# Patient Record
Sex: FEMALE | Race: WHITE | Employment: OTHER | URBAN - METROPOLITAN AREA
[De-identification: names, ages, dates, MRNs, and addresses within clinical notes are randomized per-mention and may not be internally consistent; named-entity substitution may affect disease eponyms.]

---

## 2017-02-06 ENCOUNTER — TELEPHONE (OUTPATIENT)
Dept: CARDIOLOGY CLINIC | Facility: CLINIC | Age: 82
End: 2017-02-06

## 2017-02-06 NOTE — TELEPHONE ENCOUNTER
Pt was at [facility] a couple of weeks ago with sob and edema.l Her medications were changed during her hospitalization. Per daughter would like to know if she should continue with changes.    Pt has not seen Dr. Viki Apley since 2015, No available appts

## 2017-02-06 NOTE — TELEPHONE ENCOUNTER
[facility] pt was d/c from [facility] and have questions re: Digoxin & Metoprolol rx. Pls call. Thank you.

## 2017-02-10 ENCOUNTER — PRIOR ORIGINAL RECORDS (OUTPATIENT)
Dept: OTHER | Age: 82
End: 2017-02-10

## 2017-02-10 ENCOUNTER — MYAURORA ACCOUNT LINK (OUTPATIENT)
Dept: OTHER | Age: 82
End: 2017-02-10

## 2017-04-05 ENCOUNTER — OFFICE VISIT (OUTPATIENT)
Dept: CARDIOLOGY CLINIC | Facility: CLINIC | Age: 82
End: 2017-04-05

## 2017-04-05 VITALS
SYSTOLIC BLOOD PRESSURE: 108 MMHG | DIASTOLIC BLOOD PRESSURE: 50 MMHG | BODY MASS INDEX: 22 KG/M2 | HEART RATE: 72 BPM | WEIGHT: 129 LBS | RESPIRATION RATE: 18 BRPM

## 2017-04-05 DIAGNOSIS — I50.32 DIASTOLIC DYSFUNCTION WITH CHRONIC HEART FAILURE (HCC): Primary | ICD-10-CM

## 2017-04-05 DIAGNOSIS — I07.1 SEVERE TRICUSPID REGURGITATION: ICD-10-CM

## 2017-04-05 DIAGNOSIS — I34.0 MODERATE MITRAL INSUFFICIENCY: ICD-10-CM

## 2017-04-05 PROCEDURE — G0463 HOSPITAL OUTPT CLINIC VISIT: HCPCS | Performed by: INTERNAL MEDICINE

## 2017-04-05 PROCEDURE — 99214 OFFICE O/P EST MOD 30 MIN: CPT | Performed by: INTERNAL MEDICINE

## 2017-04-05 RX ORDER — METOPROLOL TARTRATE 50 MG/1
25 TABLET, FILM COATED ORAL 2 TIMES DAILY
Status: ON HOLD | COMMUNITY
End: 2017-04-16

## 2017-04-05 NOTE — PATIENT INSTRUCTIONS
Continue current medications. Increase activity as tolerated. Follow-up with me in 6 months or sooner if needed.

## 2017-04-05 NOTE — PROGRESS NOTES
Georgia is a 80year old female. Patient presents with:  Atrial Fibrillation  Hypertension  CHF  Edema: lower extremity    HPI:   [place] is here for follow-up appointment.   She has history of diastolic dysfunction, chronic atrial fibrillation and pain  GI: denies abdominal pain and denies heartburn  NEURO: denies headaches    EXAM:   /50 mmHg  Pulse 72  Resp 18  Wt 129 lb (58.514 kg)  GENERAL: well developed, well nourished,in no apparent distress  SKIN: no rashes,no suspicious lesions  HEENT

## 2017-04-07 ENCOUNTER — TELEPHONE (OUTPATIENT)
Dept: CARDIOLOGY CLINIC | Facility: CLINIC | Age: 82
End: 2017-04-07

## 2017-04-07 RX ORDER — DIGOXIN 125 UG/1
1 TABLET ORAL DAILY
Status: ON HOLD | COMMUNITY
Start: 2017-04-05 | End: 2017-04-16

## 2017-04-11 ENCOUNTER — APPOINTMENT (OUTPATIENT)
Dept: CT IMAGING | Facility: HOSPITAL | Age: 82
DRG: 418 | End: 2017-04-11
Attending: EMERGENCY MEDICINE
Payer: MEDICARE

## 2017-04-11 ENCOUNTER — APPOINTMENT (OUTPATIENT)
Dept: GENERAL RADIOLOGY | Facility: HOSPITAL | Age: 82
DRG: 418 | End: 2017-04-11
Attending: EMERGENCY MEDICINE
Payer: MEDICARE

## 2017-04-11 ENCOUNTER — HOSPITAL ENCOUNTER (INPATIENT)
Facility: HOSPITAL | Age: 82
LOS: 5 days | Discharge: HOME | DRG: 418 | End: 2017-04-17
Attending: EMERGENCY MEDICINE | Admitting: HOSPITALIST
Payer: MEDICARE

## 2017-04-11 DIAGNOSIS — K85.90 ACUTE PANCREATITIS, UNSPECIFIED COMPLICATION STATUS, UNSPECIFIED PANCREATITIS TYPE: ICD-10-CM

## 2017-04-11 DIAGNOSIS — I50.32 DIASTOLIC DYSFUNCTION WITH CHRONIC HEART FAILURE (HCC): ICD-10-CM

## 2017-04-11 DIAGNOSIS — K44.9 HIATAL HERNIA: ICD-10-CM

## 2017-04-11 DIAGNOSIS — K31.7 GASTRIC POLYPS: Primary | ICD-10-CM

## 2017-04-11 PROBLEM — R79.89 AZOTEMIA: Status: ACTIVE | Noted: 2017-04-11

## 2017-04-11 PROBLEM — R73.9 HYPERGLYCEMIA: Status: ACTIVE | Noted: 2017-04-11

## 2017-04-11 PROCEDURE — 71010 XR CHEST AP PORTABLE  (CPT=71010): CPT

## 2017-04-11 PROCEDURE — 99223 1ST HOSP IP/OBS HIGH 75: CPT | Performed by: HOSPITALIST

## 2017-04-11 PROCEDURE — 74177 CT ABD & PELVIS W/CONTRAST: CPT

## 2017-04-11 RX ORDER — MORPHINE SULFATE 2 MG/ML
2 INJECTION, SOLUTION INTRAMUSCULAR; INTRAVENOUS ONCE
Status: COMPLETED | OUTPATIENT
Start: 2017-04-11 | End: 2017-04-11

## 2017-04-11 RX ORDER — ONDANSETRON 2 MG/ML
4 INJECTION INTRAMUSCULAR; INTRAVENOUS ONCE
Status: COMPLETED | OUTPATIENT
Start: 2017-04-11 | End: 2017-04-11

## 2017-04-11 RX ORDER — SODIUM CHLORIDE 9 MG/ML
INJECTION, SOLUTION INTRAVENOUS ONCE
Status: COMPLETED | OUTPATIENT
Start: 2017-04-11 | End: 2017-04-11

## 2017-04-12 ENCOUNTER — APPOINTMENT (OUTPATIENT)
Dept: MRI IMAGING | Facility: HOSPITAL | Age: 82
DRG: 418 | End: 2017-04-12
Attending: INTERNAL MEDICINE
Payer: MEDICARE

## 2017-04-12 PROBLEM — K85.90 ACUTE PANCREATITIS, UNSPECIFIED COMPLICATION STATUS, UNSPECIFIED PANCREATITIS TYPE: Status: ACTIVE | Noted: 2017-04-12

## 2017-04-12 PROBLEM — K85.90 ACUTE PANCREATITIS: Status: ACTIVE | Noted: 2017-04-12

## 2017-04-12 PROCEDURE — 99223 1ST HOSP IP/OBS HIGH 75: CPT | Performed by: INTERNAL MEDICINE

## 2017-04-12 PROCEDURE — 74181 MRI ABDOMEN W/O CONTRAST: CPT

## 2017-04-12 RX ORDER — SODIUM CHLORIDE 9 MG/ML
INJECTION, SOLUTION INTRAVENOUS CONTINUOUS
Status: DISPENSED | OUTPATIENT
Start: 2017-04-12 | End: 2017-04-13

## 2017-04-12 RX ORDER — METOCLOPRAMIDE HYDROCHLORIDE 5 MG/ML
10 INJECTION INTRAMUSCULAR; INTRAVENOUS EVERY 6 HOURS PRN
Status: DISCONTINUED | OUTPATIENT
Start: 2017-04-12 | End: 2017-04-17

## 2017-04-12 RX ORDER — MEMANTINE HYDROCHLORIDE 10 MG/1
10 TABLET ORAL DAILY
Status: DISCONTINUED | OUTPATIENT
Start: 2017-04-12 | End: 2017-04-17

## 2017-04-12 RX ORDER — SODIUM CHLORIDE AND POTASSIUM CHLORIDE .9; .15 G/100ML; G/100ML
SOLUTION INTRAVENOUS CONTINUOUS
Status: DISCONTINUED | OUTPATIENT
Start: 2017-04-12 | End: 2017-04-12

## 2017-04-12 RX ORDER — DIGOXIN 125 MCG
125 TABLET ORAL DAILY
Status: DISCONTINUED | OUTPATIENT
Start: 2017-04-12 | End: 2017-04-12

## 2017-04-12 RX ORDER — MORPHINE SULFATE 2 MG/ML
2 INJECTION, SOLUTION INTRAMUSCULAR; INTRAVENOUS EVERY 2 HOUR PRN
Status: DISCONTINUED | OUTPATIENT
Start: 2017-04-12 | End: 2017-04-17

## 2017-04-12 RX ORDER — MORPHINE SULFATE 4 MG/ML
4 INJECTION, SOLUTION INTRAMUSCULAR; INTRAVENOUS EVERY 2 HOUR PRN
Status: DISCONTINUED | OUTPATIENT
Start: 2017-04-12 | End: 2017-04-17

## 2017-04-12 RX ORDER — 0.9 % SODIUM CHLORIDE 0.9 %
VIAL (ML) INJECTION
Status: COMPLETED
Start: 2017-04-12 | End: 2017-04-12

## 2017-04-12 RX ORDER — SODIUM CHLORIDE, SODIUM LACTATE, POTASSIUM CHLORIDE, CALCIUM CHLORIDE 600; 310; 30; 20 MG/100ML; MG/100ML; MG/100ML; MG/100ML
INJECTION, SOLUTION INTRAVENOUS CONTINUOUS
Status: DISCONTINUED | OUTPATIENT
Start: 2017-04-12 | End: 2017-04-14

## 2017-04-12 RX ORDER — ACETAMINOPHEN 325 MG/1
650 TABLET ORAL EVERY 6 HOURS PRN
Status: DISCONTINUED | OUTPATIENT
Start: 2017-04-12 | End: 2017-04-17

## 2017-04-12 RX ORDER — ONDANSETRON 2 MG/ML
4 INJECTION INTRAMUSCULAR; INTRAVENOUS EVERY 6 HOURS PRN
Status: DISCONTINUED | OUTPATIENT
Start: 2017-04-12 | End: 2017-04-17

## 2017-04-12 RX ORDER — FUROSEMIDE 10 MG/ML
10 INJECTION INTRAMUSCULAR; INTRAVENOUS ONCE
Status: COMPLETED | OUTPATIENT
Start: 2017-04-12 | End: 2017-04-12

## 2017-04-12 NOTE — CONSULTS
Sarah Peace 89   Gastroenterology Consultation Note    [place]  Patient Status:    Inpatient  Date of Admission:         4/11/2017, Hospital day #1  Attending:   Cinthia York MD  PCP:     Ioana Waddell    Reason for Consultatio that she drinks alcohol. She reports that she does not use illicit drugs.     Allergies:    Pcn [Penicillins]       Unknown    Medications:    Current facility-administered medications:   •  digoxin (LANOXIN) tab 125 mcg, 125 mcg, Oral, Daily  •  Memantine breathing  Abdomen- soft, mild epigastric ttp,no guarding/rebound  Skin- No jaundice  Ext: no cyanosis, mild edema  Neuro- Alert and interactive, but confused      Laboratory Data:    Lab Results  Component Value Date   WBC 8.4 04/12/2017   HGB 12.2 04/12/ year old female w/ a history of colon cancer (hx of hemicolectomy 30 years ago), afib (not on anticoagulation), severe tricuspid regurgitation, dementia, CHF, who presents with ab pain that started day before admission, acute onset, 7/10, non-radiating, mo

## 2017-04-12 NOTE — PROGRESS NOTES
[facility]    Progress Note    [place] Patient Status:  Inpatient    1924 MRN [id number]   Location [facility] 5SW/SE Attending Rachele Donahue MD   Hosp Day # 1 PCP Marisol Umaña     Subjective:     Constitutional pending      Results:     Lab Results  Component Value Date   WBC 8.4 04/12/2017   HGB 12.2 04/12/2017   HCT 38.2 04/12/2017    04/12/2017   CREATSERUM 0.65 04/12/2017   BUN 12 04/12/2017    04/12/2017   K 4.4 04/12/2017    04/12/2017

## 2017-04-12 NOTE — PROGRESS NOTES
Please refer to Dr. Fede Duran note for details  Here with acute pancreatitis  Ms. Archie Burroughs lives at home with her daughter- no etoh as likely etiology  Will look at meds as cause  Imaging per GI  GI to see  [facility]- sees Dr. Joel Schwab as an outpatient- plan consult to

## 2017-04-12 NOTE — ED PROVIDER NOTES
Patient Seen in: [facility] AND [facility] Emergency Department    History   No chief complaint on file. Stated Complaint: Chest pain    HPI    Patient complains of 10 out of 10 mid upper abdominal pain started today. Abdominal pain that began today.   Pain d negative except as noted above. PSFH elements reviewed from today and agreed except as otherwise stated in HPI.     Physical Exam       ED Triage Vitals   BP 04/11/17 2112 142/60 mmHg   Pulse 04/11/17 2112 122   Resp 04/11/17 2112 20   Temp 04/11/17 2112 PLATELET    Narrative: The following orders were created for panel order CBC WITH DIFFERENTIAL WITH PLATELET.   Procedure                               Abnormality         Status                     ---------                               ----------- Hyperglycemia R73.9 4/11/2017 Yes

## 2017-04-12 NOTE — ED NOTES
Presents to ER with family for c/o epigastric pain and vomiting which started this evening, L upper abdomen tender on my exam. Hx of colon cancer and resection approx 30 years ago. States she has never had a bowel obstruction. Last BM this evening.  NPO sin

## 2017-04-12 NOTE — PLAN OF CARE
Problem: Patient Centered Care  Goal: Patient preferences are identified and integrated in the patient’s plan of care  Interventions:  - What would you like us to know as we care for you?  My daughter Humphrey Rodriguez is involved in my care  - Provide timely, compl Consider collaborating with pharmacy to review patient’s medication profile   Outcome: Progressing  Goal: Maintains adequate nutritional intake (undernourished)  INTERVENTIONS:  - Monitor percentage of each meal consumed  - Identify factors contributing to

## 2017-04-12 NOTE — H&P
[address] [place] [facility] Patient Status:  Inpatient    1924 MRN [id number]   Location [facility] 5SW/SE Attending Doretha Voss MD   Hosp Day # 1 PCP Paramjit Colindres     Date:  2017  Date of A Patient Reported? Taking? [address] 125 MCG Oral Tab Taking  Yes Yes   Sig: Take 1 tablet by mouth daily. Memantine HCl (NAMENDA) 10 MG Oral Tab Taking  No Yes   Sig: Take 1 tablet (10 mg total) by mouth 2 (two) times daily.    Patient taking differently: T range of motion. normal strength. Feet:  Normal pulses. Neurologic:  Alert, oriented, no focal deficits, cranial nerves II-XII are grossly intact. Cognition and Speech:  Oriented, speech clear and coherent.   Psychiatric:  Cooperative, appropriate mood

## 2017-04-12 NOTE — PLAN OF CARE
Patient received via bed from 5th floor. Pt awake and alert, not in distress. Family at bedside. VSS. SR 71. Pt denies shortness of breath. Orientated pt to room. Call light and belongings placed within reach.

## 2017-04-12 NOTE — HISTORICAL OFFICE NOTE
Lyndaradha Bateman  : 1924  ACCOUNT:  [id number]  [phone]  PCP: Dr. Michelle Russell     TODAY'S DATE: 02/10/2017  DICTATED BY:  Mariah Champion M.D.]    CHIEF COMPLAINT: [Hospital D/C.]    HPI:  [On 02/10/2017, [place], a 80year old female, pres reviewed and discussed. HEAD/FACE: no trauma and normocephalic. EYES: conjunctivae not injected and no xanthelasma. ENT: mucosa pink and moist. NECK: jugular venous pressure not elevated.  RESP: respirations with normal rate and rhythm, clear to auscultatio 02/10/17 Potassium Chloride [id number]     1 daily                                    Morelia Baker M.D., F.A.C.C.   Kenya/nury-Job ID: [id number]  D: 02/10/2017   T: 02/18/2017  C: Dr. Leia Fan

## 2017-04-12 NOTE — DISCHARGE PLANNING
CLARA spoke with the pt and dtr for initial assessment. Pt lives with her dtr and family in a single family home with 6 step entry, no interior stairs. Dtr works during the day, but the son in law is home during the day.  Pt is ambulatory without device, no br

## 2017-04-12 NOTE — PLAN OF CARE
Spoke with Dr. Jamilah Casas regarding patient's low heart rate. Per Dr. Jamilah Casas, patient can be transferred to cardiology if ok with Dr. Onesimo Mathias. Spoke with Dr. Onesimo Mathias and she states that patient is ok to transfer.

## 2017-04-12 NOTE — PLAN OF CARE
Problem: Patient/Family Goals  Goal: Patient/Family Long Term Goal  Patient’s Long Term Goal: to go home     Interventions:  - Monitor labs  - administer meds as ordered    - See additional Care Plan goals for specific interventions   Outcome: Not Progress

## 2017-04-13 ENCOUNTER — APPOINTMENT (OUTPATIENT)
Dept: CV DIAGNOSTICS | Facility: HOSPITAL | Age: 82
DRG: 418 | End: 2017-04-13
Attending: NURSE PRACTITIONER
Payer: MEDICARE

## 2017-04-13 PROCEDURE — 99233 SBSQ HOSP IP/OBS HIGH 50: CPT | Performed by: HOSPITALIST

## 2017-04-13 PROCEDURE — 93306 TTE W/DOPPLER COMPLETE: CPT | Performed by: INTERNAL MEDICINE

## 2017-04-13 PROCEDURE — 99232 SBSQ HOSP IP/OBS MODERATE 35: CPT | Performed by: INTERNAL MEDICINE

## 2017-04-13 PROCEDURE — 93306 TTE W/DOPPLER COMPLETE: CPT

## 2017-04-13 RX ORDER — CLINDAMYCIN PHOSPHATE 600 MG/50ML
600 INJECTION INTRAVENOUS
Status: DISCONTINUED | OUTPATIENT
Start: 2017-04-14 | End: 2017-04-14

## 2017-04-13 NOTE — CONSULTS
[address] IL[address] Shah  :1924  [facility]:[id number]  LOS:2    Date of Admission:  2017  Date of Consult:  2017     Reason for Consultation: gallstone pancreatitis       History of Present Illness: Q6H PRN  •  morphINE sulfate (PF) 2 MG/ML injection 2 mg, 2 mg, Intravenous, Q2H PRN **OR** morphINE sulfate (PF) 4 MG/ML injection 4 mg, 4 mg, Intravenous, Q2H PRN  •  ondansetron HCl (ZOFRAN) injection 4 mg, 4 mg, Intravenous, Q6H PRN  •  Pantoprazole So 0. 57  0.65  0.76   GFRAA  >60  >60  >60   GFRNAA  >60  >60  >60   CA  8.6  8.0*  8.1*   NA  137  139  138   K  3.9  4.4  4.1   CL  101  105  104   CO2  28  29  26         Lab Results  Component Value Date    04/13/2017   K 4.1 04/13/2017    04/ Interstitial changes in the lung bases suggesting mild edema. 5.  Cardiomegaly. There is reflux of injected contrast into the hepatic IVC and hepatic veins. These findings suggest right heart dysfunction.  6.  Diffuse colonic diverticulosis without inflam will see the patient and decide if the patient can be cleared for surgery to be done tentatively scheduled for tomorrow. Surgery can be postponed if further Cardiologic workup is needed. I discussed with hospitalist Dr. Mauricio Peterson.     Thank you    ANNIE JESUS SI

## 2017-04-13 NOTE — PHYSICAL THERAPY NOTE
PHYSICAL THERAPY EVALUATION - INPATIENT     Room Number: 157/992-A  Evaluation Date: 4/13/2017  Type of Evaluation: Initial  Physician Order: PT Eval and Treat    Presenting Problem: acute pancreaitis  Reason for Therapy: Mobility Dysfunction and Disch Problems:    Hyperglycemia    Azotemia    Acute pancreatitis      Past Medical History  Past Medical History   Diagnosis Date   • Cancer [facility])      colon cancer   • A-fib ([facility] [address] 2/18/2015   • Severe tricuspid regurgitation 2/18/2015   • Moderate mitral insu TOLERANCE  Room air    AM-PAC '6-Clicks' INPATIENT SHORT FORM - BASIC MOBILITY  How much difficulty does the patient currently have. ..  -   Turning over in bed (including adjusting bedclothes, sheets and blankets)?: None   -   Sitting down on and standing Current Status    Goal #4 Patient will negotiate 8 stairs/one curb w/ assistive device and supervision   Goal #4   Current Status    Goal #5 Patient to demonstrate independence with home activity/exercise instructions provided to patient in preparation f

## 2017-04-13 NOTE — PLAN OF CARE
GASTROINTESTINAL - ADULT    • Minimal or absence of nausea and vomiting Progressing    • Maintains or returns to baseline bowel function Progressing    • Maintains adequate nutritional intake (undernourished) Progressing        Patient Centered Care    • P

## 2017-04-13 NOTE — PROGRESS NOTES
[facility] Heart Cardiology   Progress Note    [place] Patient Status:  Inpatient    1924 MRN [id number]   Location [facility] 3W/SW Attending Evelyne Altamirano MD   Hosp Day # 2 PCP Opal Camilo daily on hold  -on IVF at 60ml/hr    5) Hypotension  --120s, 89 X 1  -on Lopressor 12.5mg BID with holding parameters to hold if HR<65  -on mild IVF    Recommendations:  -plan is EGD and lap lluvia tomorrow  -await echo results and repeat EKG  -will a stranding in the evelio hepatis, around the pancreatic head, and mesenteric root. This is most likely related to underlying cirrhosis however correlate for acute pancreatitis.  3.  Gallbladder wall thickening, likely secondary to cirrhosis however correlate

## 2017-04-13 NOTE — PROGRESS NOTES
Sarah Peace 89     Gastroenterology Progress Note    [place] Patient Status:  Inpatient    1924 MRN [id number]   [facility] 3W/SW Attending Cinthia York MD   [facility] Day # 2 [facility] [address] [address] cholelithiasis/cholecystitis noted, likely passed gallstone. Will obtain surgical consultation. She has compensated cirrhosis (other than small ascites) which increases her risk of surgery, but overall LFTs remains stable.     Recommend:  -D/c fluids due to thickening/edema and trace pericholecystic fluid compatible with cholecystitis. 2. No evidence for common bile duct stone or bile duct obstruction. 3. Mild pancreatitis involving pancreatic head and uncinate.  Findings may be related to a recently passed ca posterior fasicular block. ARTIFACT in precordial leads prevents further interpretation ABNORMAL When compared with ECG of 08/22/2015 22:18:13 no significant changes have occurred.  RECOMMEND REPEAT ECG IF CLINICALLY INDICATED Electronically signed on 04/1

## 2017-04-13 NOTE — PROGRESS NOTES
[facility]    Progress Note    [place] Patient Status:  Inpatient    1924 MRN [id number]   Location [facility] 3W/SW Attending Leopold Mcalpine, MD   Hosp Day # 2 PCP Uri Sung       Subjective:   [place] pericholecystic fluid compatible with cholecystitis. 2. No evidence for common bile duct stone or bile duct obstruction. 3. Mild pancreatitis involving pancreatic head and uncinate. Findings may be related to a recently passed calculus.  4. Hepatic cirrhosi ARTIFACT in precordial leads prevents further interpretation ABNORMAL When compared with ECG of 08/22/2015 22:18:13 no significant changes have occurred.  RECOMMEND REPEAT ECG IF CLINICALLY INDICATED Electronically signed on 04/12/2017 at 18:00 by Laura Dudley

## 2017-04-13 NOTE — PROGRESS NOTES
[facility]    Progress Note    [place] Patient Status:  Inpatient    1924 MRN [id number]   Location [facility] 5SW/SE Attending Karon Schulte MD   Hosp Day # 2 PCP Yue Barrera     Subjective:     Constitutional Dr. Sharan Chappell with patient and family.     Disposition: pending      Results:       Lab Results  Component Value Date   WBC 8.4 04/12/2017   HGB 12.2 04/12/2017   HCT 38.2 04/12/2017    04/12/2017   CREATSERUM 0.76 04/13/2017   BUN 14 04/13/2017   NA 13 Small left pleural effusion. Trace right pleural effusion. Interstitial changes in the lung bases suggesting mild edema. 5.  Cardiomegaly. There is reflux of injected contrast into the hepatic IVC and hepatic veins.   These findings suggest right heart d

## 2017-04-13 NOTE — CONSULTS
[facility]    PATIENT'S NAME: Sal Lung   ATTENDING PHYSICIAN: Jovan Velez MD   CONSULTING PHYSICIAN: Berna Duran MD   PATIENT ACCOUNT#:   [id number]    LOCATION:  [address] [place] [address] [address]:   [id number]       DATE OF BIRTH: Last note by  _______ mentioned that she was seen at the hospital at [facility]. She had altered mental status, atrial fibrillation with rapid ventricular response probably in the setting of infection. Her metoprolol was cut in half.   Digoxin was starte small pericardial effusion. CT of the abdomen:  Hepatic cirrhosis, small pleural effusion, mild interstitial lung edema, cardiomegaly suggesting right ventricular dysfunction, colonic diverticulosis without inflammation.     Patient's chest x-ray:  Stabl is stable. We will continue following with you. In the interim, if patient becomes _______ please call Cardiology. Discussed above with Dr. Jerry Valdivia, patient's PCP hospitalist.  Patient's daughter was also at bedside and was updated with the above.

## 2017-04-13 NOTE — OCCUPATIONAL THERAPY NOTE
OCCUPATIONAL THERAPY QUICK EVALUATION - INPATIENT    Room Number: 351/064-M  Evaluation Date: 4/13/2017     Type of Evaluation: Quick Eval  Presenting Problem: acute pancreatitis    Physician Order: IP Consult to Occupational Therapy  Reason for Therapy: ASSESSMENT  AM-PAC ‘6-Clicks’ Inpatient Daily Activity Short Form  How much help from another person does the patient currently need…  -   Putting on and taking off regular lower body clothing?: A Little  -   Bathing (including washing, rinsing, drying)?:

## 2017-04-13 NOTE — SPIRITUAL CARE NOTE
Chp introduced spiritual care to pt's family; pt was resting. The family appreciates regular pastoral follow-up.

## 2017-04-14 ENCOUNTER — SURGERY (OUTPATIENT)
Age: 82
End: 2017-04-14

## 2017-04-14 ENCOUNTER — ANESTHESIA (OUTPATIENT)
Dept: ENDOSCOPY | Facility: HOSPITAL | Age: 82
DRG: 418 | End: 2017-04-14
Payer: MEDICARE

## 2017-04-14 ENCOUNTER — ANESTHESIA EVENT (OUTPATIENT)
Dept: ENDOSCOPY | Facility: HOSPITAL | Age: 82
DRG: 418 | End: 2017-04-14
Payer: MEDICARE

## 2017-04-14 ENCOUNTER — ANESTHESIA (OUTPATIENT)
Dept: SURGERY | Facility: HOSPITAL | Age: 82
DRG: 418 | End: 2017-04-14
Payer: MEDICARE

## 2017-04-14 ENCOUNTER — APPOINTMENT (OUTPATIENT)
Dept: GENERAL RADIOLOGY | Facility: HOSPITAL | Age: 82
DRG: 418 | End: 2017-04-14
Attending: SURGERY
Payer: MEDICARE

## 2017-04-14 ENCOUNTER — ANESTHESIA EVENT (OUTPATIENT)
Dept: SURGERY | Facility: HOSPITAL | Age: 82
DRG: 418 | End: 2017-04-14
Payer: MEDICARE

## 2017-04-14 PROCEDURE — BF131ZZ FLUOROSCOPY OF GALLBLADDER AND BILE DUCTS USING LOW OSMOLAR CONTRAST: ICD-10-PCS | Performed by: SURGERY

## 2017-04-14 PROCEDURE — 0DB68ZX EXCISION OF STOMACH, VIA NATURAL OR ARTIFICIAL OPENING ENDOSCOPIC, DIAGNOSTIC: ICD-10-PCS | Performed by: INTERNAL MEDICINE

## 2017-04-14 PROCEDURE — 74300 X-RAY BILE DUCTS/PANCREAS: CPT

## 2017-04-14 PROCEDURE — 43239 EGD BIOPSY SINGLE/MULTIPLE: CPT | Performed by: INTERNAL MEDICINE

## 2017-04-14 PROCEDURE — 0FT44ZZ RESECTION OF GALLBLADDER, PERCUTANEOUS ENDOSCOPIC APPROACH: ICD-10-PCS | Performed by: SURGERY

## 2017-04-14 PROCEDURE — S0077 INJECTION, CLINDAMYCIN PHOSP: HCPCS | Performed by: NURSE ANESTHETIST, CERTIFIED REGISTERED

## 2017-04-14 PROCEDURE — 99233 SBSQ HOSP IP/OBS HIGH 50: CPT | Performed by: HOSPITALIST

## 2017-04-14 RX ORDER — HYDROMORPHONE HYDROCHLORIDE 1 MG/ML
0.4 INJECTION, SOLUTION INTRAMUSCULAR; INTRAVENOUS; SUBCUTANEOUS EVERY 5 MIN PRN
Status: DISCONTINUED | OUTPATIENT
Start: 2017-04-14 | End: 2017-04-14 | Stop reason: HOSPADM

## 2017-04-14 RX ORDER — HYDROMORPHONE HYDROCHLORIDE 1 MG/ML
0.2 INJECTION, SOLUTION INTRAMUSCULAR; INTRAVENOUS; SUBCUTANEOUS EVERY 5 MIN PRN
Status: DISCONTINUED | OUTPATIENT
Start: 2017-04-14 | End: 2017-04-14 | Stop reason: HOSPADM

## 2017-04-14 RX ORDER — PHENYLEPHRINE HCL 10 MG/ML
VIAL (ML) INJECTION AS NEEDED
Status: DISCONTINUED | OUTPATIENT
Start: 2017-04-14 | End: 2017-04-14 | Stop reason: SURG

## 2017-04-14 RX ORDER — HYDROMORPHONE HYDROCHLORIDE 1 MG/ML
0.4 INJECTION, SOLUTION INTRAMUSCULAR; INTRAVENOUS; SUBCUTANEOUS EVERY 2 HOUR PRN
Status: DISCONTINUED | OUTPATIENT
Start: 2017-04-14 | End: 2017-04-17

## 2017-04-14 RX ORDER — NALOXONE HYDROCHLORIDE 0.4 MG/ML
80 INJECTION, SOLUTION INTRAMUSCULAR; INTRAVENOUS; SUBCUTANEOUS AS NEEDED
Status: DISCONTINUED | OUTPATIENT
Start: 2017-04-14 | End: 2017-04-14 | Stop reason: HOSPADM

## 2017-04-14 RX ORDER — MORPHINE SULFATE 4 MG/ML
4 INJECTION, SOLUTION INTRAMUSCULAR; INTRAVENOUS EVERY 10 MIN PRN
Status: DISCONTINUED | OUTPATIENT
Start: 2017-04-14 | End: 2017-04-14 | Stop reason: HOSPADM

## 2017-04-14 RX ORDER — FUROSEMIDE 40 MG/1
40 TABLET ORAL DAILY
Status: DISCONTINUED | OUTPATIENT
Start: 2017-04-15 | End: 2017-04-15

## 2017-04-14 RX ORDER — HYDROCODONE BITARTRATE AND ACETAMINOPHEN 5; 325 MG/1; MG/1
1 TABLET ORAL EVERY 4 HOURS PRN
Status: DISCONTINUED | OUTPATIENT
Start: 2017-04-14 | End: 2017-04-17

## 2017-04-14 RX ORDER — DEXAMETHASONE SODIUM PHOSPHATE 4 MG/ML
VIAL (ML) INJECTION AS NEEDED
Status: DISCONTINUED | OUTPATIENT
Start: 2017-04-14 | End: 2017-04-14 | Stop reason: SURG

## 2017-04-14 RX ORDER — ONDANSETRON 2 MG/ML
4 INJECTION INTRAMUSCULAR; INTRAVENOUS ONCE AS NEEDED
Status: DISCONTINUED | OUTPATIENT
Start: 2017-04-14 | End: 2017-04-14 | Stop reason: HOSPADM

## 2017-04-14 RX ORDER — EPHEDRINE SULFATE 50 MG/ML
INJECTION, SOLUTION INTRAVENOUS AS NEEDED
Status: DISCONTINUED | OUTPATIENT
Start: 2017-04-14 | End: 2017-04-14

## 2017-04-14 RX ORDER — HALOPERIDOL 5 MG/ML
0.25 INJECTION INTRAMUSCULAR ONCE AS NEEDED
Status: DISCONTINUED | OUTPATIENT
Start: 2017-04-14 | End: 2017-04-14 | Stop reason: HOSPADM

## 2017-04-14 RX ORDER — LIDOCAINE HYDROCHLORIDE 10 MG/ML
INJECTION, SOLUTION EPIDURAL; INFILTRATION; INTRACAUDAL; PERINEURAL AS NEEDED
Status: DISCONTINUED | OUTPATIENT
Start: 2017-04-14 | End: 2017-04-14 | Stop reason: SURG

## 2017-04-14 RX ORDER — ROCURONIUM BROMIDE 10 MG/ML
INJECTION, SOLUTION INTRAVENOUS AS NEEDED
Status: DISCONTINUED | OUTPATIENT
Start: 2017-04-14 | End: 2017-04-14 | Stop reason: SURG

## 2017-04-14 RX ORDER — ONDANSETRON 2 MG/ML
INJECTION INTRAMUSCULAR; INTRAVENOUS AS NEEDED
Status: DISCONTINUED | OUTPATIENT
Start: 2017-04-14 | End: 2017-04-14 | Stop reason: SURG

## 2017-04-14 RX ORDER — HYDROCODONE BITARTRATE AND ACETAMINOPHEN 5; 325 MG/1; MG/1
2 TABLET ORAL EVERY 4 HOURS PRN
Status: DISCONTINUED | OUTPATIENT
Start: 2017-04-14 | End: 2017-04-17

## 2017-04-14 RX ORDER — HEPARIN SODIUM 5000 [USP'U]/ML
5000 INJECTION, SOLUTION INTRAVENOUS; SUBCUTANEOUS EVERY 12 HOURS
Status: DISCONTINUED | OUTPATIENT
Start: 2017-04-14 | End: 2017-04-17

## 2017-04-14 RX ORDER — BUPIVACAINE HYDROCHLORIDE AND EPINEPHRINE 2.5; 5 MG/ML; UG/ML
INJECTION, SOLUTION INFILTRATION; PERINEURAL AS NEEDED
Status: DISCONTINUED | OUTPATIENT
Start: 2017-04-14 | End: 2017-04-14 | Stop reason: HOSPADM

## 2017-04-14 RX ORDER — SODIUM CHLORIDE, SODIUM LACTATE, POTASSIUM CHLORIDE, CALCIUM CHLORIDE 600; 310; 30; 20 MG/100ML; MG/100ML; MG/100ML; MG/100ML
INJECTION, SOLUTION INTRAVENOUS CONTINUOUS
Status: DISCONTINUED | OUTPATIENT
Start: 2017-04-14 | End: 2017-04-15

## 2017-04-14 RX ORDER — HYDROMORPHONE HYDROCHLORIDE 1 MG/ML
0.2 INJECTION, SOLUTION INTRAMUSCULAR; INTRAVENOUS; SUBCUTANEOUS EVERY 2 HOUR PRN
Status: DISCONTINUED | OUTPATIENT
Start: 2017-04-14 | End: 2017-04-17

## 2017-04-14 RX ORDER — ONDANSETRON 2 MG/ML
4 INJECTION INTRAMUSCULAR; INTRAVENOUS EVERY 6 HOURS PRN
Status: DISCONTINUED | OUTPATIENT
Start: 2017-04-14 | End: 2017-04-17

## 2017-04-14 RX ORDER — HYDROMORPHONE HYDROCHLORIDE 1 MG/ML
0.6 INJECTION, SOLUTION INTRAMUSCULAR; INTRAVENOUS; SUBCUTANEOUS EVERY 5 MIN PRN
Status: DISCONTINUED | OUTPATIENT
Start: 2017-04-14 | End: 2017-04-14 | Stop reason: HOSPADM

## 2017-04-14 RX ORDER — SODIUM CHLORIDE, SODIUM LACTATE, POTASSIUM CHLORIDE, CALCIUM CHLORIDE 600; 310; 30; 20 MG/100ML; MG/100ML; MG/100ML; MG/100ML
INJECTION, SOLUTION INTRAVENOUS CONTINUOUS PRN
Status: DISCONTINUED | OUTPATIENT
Start: 2017-04-14 | End: 2017-04-14 | Stop reason: SURG

## 2017-04-14 RX ORDER — MORPHINE SULFATE 10 MG/ML
6 INJECTION, SOLUTION INTRAMUSCULAR; INTRAVENOUS EVERY 10 MIN PRN
Status: DISCONTINUED | OUTPATIENT
Start: 2017-04-14 | End: 2017-04-14 | Stop reason: HOSPADM

## 2017-04-14 RX ORDER — MORPHINE SULFATE 2 MG/ML
2 INJECTION, SOLUTION INTRAMUSCULAR; INTRAVENOUS EVERY 10 MIN PRN
Status: DISCONTINUED | OUTPATIENT
Start: 2017-04-14 | End: 2017-04-14 | Stop reason: HOSPADM

## 2017-04-14 RX ORDER — CLINDAMYCIN PHOSPHATE 150 MG/ML
INJECTION, SOLUTION INTRAVENOUS AS NEEDED
Status: DISCONTINUED | OUTPATIENT
Start: 2017-04-14 | End: 2017-04-14 | Stop reason: SURG

## 2017-04-14 RX ORDER — SODIUM CHLORIDE, SODIUM LACTATE, POTASSIUM CHLORIDE, CALCIUM CHLORIDE 600; 310; 30; 20 MG/100ML; MG/100ML; MG/100ML; MG/100ML
INJECTION, SOLUTION INTRAVENOUS CONTINUOUS
Status: DISCONTINUED | OUTPATIENT
Start: 2017-04-14 | End: 2017-04-14

## 2017-04-14 RX ORDER — HYDROCODONE BITARTRATE AND ACETAMINOPHEN 5; 325 MG/1; MG/1
1 TABLET ORAL AS NEEDED
Status: DISCONTINUED | OUTPATIENT
Start: 2017-04-14 | End: 2017-04-14 | Stop reason: HOSPADM

## 2017-04-14 RX ORDER — HYDROCODONE BITARTRATE AND ACETAMINOPHEN 5; 325 MG/1; MG/1
2 TABLET ORAL AS NEEDED
Status: DISCONTINUED | OUTPATIENT
Start: 2017-04-14 | End: 2017-04-14 | Stop reason: HOSPADM

## 2017-04-14 RX ADMIN — LIDOCAINE HYDROCHLORIDE 25 MG: 10 INJECTION, SOLUTION EPIDURAL; INFILTRATION; INTRACAUDAL; PERINEURAL at 11:44:00

## 2017-04-14 RX ADMIN — ROCURONIUM BROMIDE 10 MG: 10 INJECTION, SOLUTION INTRAVENOUS at 12:10:00

## 2017-04-14 RX ADMIN — DEXAMETHASONE SODIUM PHOSPHATE 4 MG: 4 MG/ML VIAL (ML) INJECTION at 11:44:00

## 2017-04-14 RX ADMIN — CLINDAMYCIN PHOSPHATE 600 MG: 150 INJECTION, SOLUTION INTRAVENOUS at 11:52:00

## 2017-04-14 RX ADMIN — SODIUM CHLORIDE, SODIUM LACTATE, POTASSIUM CHLORIDE, CALCIUM CHLORIDE: 600; 310; 30; 20 INJECTION, SOLUTION INTRAVENOUS at 10:40:00

## 2017-04-14 RX ADMIN — ROCURONIUM BROMIDE 30 MG: 10 INJECTION, SOLUTION INTRAVENOUS at 11:44:00

## 2017-04-14 RX ADMIN — SODIUM CHLORIDE, SODIUM LACTATE, POTASSIUM CHLORIDE, CALCIUM CHLORIDE: 600; 310; 30; 20 INJECTION, SOLUTION INTRAVENOUS at 10:31:00

## 2017-04-14 RX ADMIN — PHENYLEPHRINE HCL 100 MCG: 10 MG/ML VIAL (ML) INJECTION at 11:50:00

## 2017-04-14 RX ADMIN — ONDANSETRON 4 MG: 2 INJECTION INTRAMUSCULAR; INTRAVENOUS at 11:44:00

## 2017-04-14 RX ADMIN — SODIUM CHLORIDE, SODIUM LACTATE, POTASSIUM CHLORIDE, CALCIUM CHLORIDE: 600; 310; 30; 20 INJECTION, SOLUTION INTRAVENOUS at 11:44:00

## 2017-04-14 RX ADMIN — SODIUM CHLORIDE, SODIUM LACTATE, POTASSIUM CHLORIDE, CALCIUM CHLORIDE: 600; 310; 30; 20 INJECTION, SOLUTION INTRAVENOUS at 13:12:00

## 2017-04-14 RX ADMIN — LIDOCAINE HYDROCHLORIDE 25 MG: 10 INJECTION, SOLUTION EPIDURAL; INFILTRATION; INTRACAUDAL; PERINEURAL at 10:34:00

## 2017-04-14 RX ADMIN — PHENYLEPHRINE HCL 100 MCG: 10 MG/ML VIAL (ML) INJECTION at 11:45:00

## 2017-04-14 NOTE — PROGRESS NOTES
[facility]    Progress Note    [place] Patient Status:  Inpatient    1924 MRN [id number]   Location [facility] 3W/SW Attending Christina Jacobs MD   Hosp Day # 3 PCP Freda List       Subjective:   [place] 1. Cholelithiasis with mild gallbladder wall thickening/edema and trace pericholecystic fluid compatible with cholecystitis. 2. No evidence for common bile duct stone or bile duct obstruction. 3. Mild pancreatitis involving pancreatic head and uncinate.  Fi

## 2017-04-14 NOTE — OPERATIVE REPORT
ESOPHAGOGASTRODUODENOSCOPY (EGD) REPORT    [facility] 1924 Age 80year old   PCP Deaoswaldo Dear Endoscopist Heidy Robert MD     Date of procedure: 2017    Procedure: EGD w/biopsy    Pre-operative diagnosis: Cirrhosis of liver appearing gastric polyps s/p biopsies. 3. Normal esophagus and proximal duodenum. Recommend:  1. Okay to proceed for cholecystectomy.   2. Discussed with family regarding results; patient is considered higher risk due to cirrhosis status; however she a

## 2017-04-14 NOTE — PLAN OF CARE
Problem: Patient Centered Care  Goal: Patient preferences are identified and integrated in the patient’s plan of care  Interventions:  - What would you like us to know as we care for you?  My daughter Humphrey Rodriguez is involved in my care  - Provide timely, compl to baseline bowel function  INTERVENTIONS:  - Assess bowel function  - Maintain adequate hydration with IV or PO as ordered and tolerated  - Evaluate effectiveness of GI medications  - Encourage mobilization and activity  - Obtain nutritional consult as ne

## 2017-04-14 NOTE — ANESTHESIA PREPROCEDURE EVALUATION
Anesthesia PreOp Note    HPI:     [place] is a 80year old female who presents for preoperative consultation requested by: Obdulio Whaley MD    Date of Surgery: 4/11/2017 - 4/14/2017    Procedure(s):  ESOPHAGOGASTRODUODENOSCOPY (EGD)  Indication Rfl: 2 Taking   Potassium Chloride ER (K-DUR) 10 MEQ Oral Tab CR Take  by mouth daily.  Disp:  Rfl: 2 Taking       Current Facility-Administered Medications Ordered in Epic:  magnesium sulfate IVPB premix 4 g 4 g Intravenous Once Yun Dyson MD Last Ra Not on file   Not on file  Other Topics Concern    Caffeine Concern No    Comment: 2 cups coffee daily or more    Exercise Yes    Comment: walking inside only, ambulates without difficulty     Social History Narrative    The patient does not use an assisti Pain Management: IV analgesics  Informed Consent Plan and Risks Discussed With:  Patient      I have informed [address]  of the nature of the anesthetic plan, benefits, risks, major complications, and any alternative forms of anesthetic management.

## 2017-04-14 NOTE — OPERATIVE REPORT
OPERATIVE REPORT:     PATIENT NAME: [place]  : 1924    CSN: [id number]     DATE OF OPERATION:   2017    PREOPERATIVE DIAGNOSIS: Chronic Cholecystitis, Cholelithiasis, gallstone pancreatitis      POSTOPERATIVE DIAGNOSIS: Chronic Mel without filling defects. Lucency seen due to edema in distal duct. There was reflux of contrast into the pancreatic duct and we did not forcefully inject to opacify the proximal bile duct.   The cholangiogram catheter was then removed and the cystic duct wa

## 2017-04-14 NOTE — PLAN OF CARE
Cont. Iv fluids, mg rider given, npo x meds for egd & lap lluiva. Seen by apn & updated. dgtr & family here. Pt. Pleasantly forgetful-reorient prn. To endo now for egd via cart w/o c/o or distress.  Plan or from endo, still awaiting clindamycin to have ready

## 2017-04-14 NOTE — H&P (VIEW-ONLY)
Sarah Peace 89   Gastroenterology Consultation Note    [place]  Patient Status:    Inpatient  Date of Admission:         4/11/2017, Hospital day #1  Attending:   Elysia Yoon MD  PCP:     Alessandro Hoyos    Reason for Consultatio that she drinks alcohol. She reports that she does not use illicit drugs.     Allergies:    Pcn [Penicillins]       Unknown    Medications:    Current facility-administered medications:   •  digoxin (LANOXIN) tab 125 mcg, 125 mcg, Oral, Daily  •  Memantine breathing  Abdomen- soft, mild epigastric ttp,no guarding/rebound  Skin- No jaundice  Ext: no cyanosis, mild edema  Neuro- Alert and interactive, but confused      Laboratory Data:    Lab Results  Component Value Date   WBC 8.4 04/12/2017   HGB 12.2 04/12/ year old female w/ a history of colon cancer (hx of hemicolectomy 30 years ago), afib (not on anticoagulation), severe tricuspid regurgitation, dementia, CHF, who presents with ab pain that started day before admission, acute onset, 7/10, non-radiating, mo

## 2017-04-14 NOTE — PROGRESS NOTES
[facility]    Progress Note    [place] Patient Status:  Inpatient    1924 MRN [id number]   Location [facility] [place] 5SW/SE Attending Yuliana Rhodes MD   Hosp Day # 3 PCP Curly Ellsworth     Subjective:     Constitutional when able  - s/p 10mg lasix 4/12    Dementia without signs of agitation  - Continue Namenda    Prophylaxis  Subcutaneous heparin    CODE STATUS  DNR- confirmed by Dr. Sharan Chappell with patient and family.     Disposition: pending      Results:       Lab Results

## 2017-04-14 NOTE — INTERVAL H&P NOTE
Pre-op Diagnosis: Cirrhosis of the Liver    The above referenced H&P was reviewed by Ignacio Martino MD on 4/14/2017, the patient was examined and no significant changes have occurred in the patient's condition since the H&P was performed.   I discussed wit

## 2017-04-14 NOTE — ANESTHESIA POSTPROCEDURE EVALUATION
Patient: José Antonio Blanca    Procedure Summary     Date Anesthesia Start Anesthesia Stop Room / Location    04/14/17 1135 1308 300 [facility] MAIN OR 04 / 300 [facility] MAIN OR       Procedure Diagnosis Surgeon Responsible Provider    LAPAROSCOPIC CHOLECYSTECTOMY (N/A Abdomen)

## 2017-04-14 NOTE — PROGRESS NOTES
[facility] Heart Cardiology  Progress Note    [place] Patient Status:  Inpatient    1924 MRN [id number]   Location [facility] 3W/SW Attending Paul Hawkins MD   Hosp Day # 3 PCP Fahad Owen restart home furosemide tomorrow   Follow up with cardiology    Results:     Lab Results  Component Value Date   WBC 9.8 04/14/2017   HGB 11.8* 04/14/2017   HCT 36.9 04/14/2017    04/14/2017   CREATSERUM 0.61 04/14/2017   BUN 15 04/14/2017   *

## 2017-04-14 NOTE — ANESTHESIA PREPROCEDURE EVALUATION
Anesthesia PreOp Note    HPI:     [place] is a 80year old female who presents for preoperative consultation requested by: Will Melendez MD    Date of Surgery: 4/11/2017 - 4/14/2017    Procedure(s):  LAPAROSCOPIC CHOLECYSTECTOMY  Indication: by mouth daily. Disp:  Rfl: 2 Taking   Potassium Chloride ER (K-DUR) 10 MEQ Oral Tab CR Take  by mouth daily.  Disp:  Rfl: 2 Taking       Current Facility-Administered Medications Ordered in Epic:  lactated ringers infusion  Intravenous Continuous Candelario, status: Former Smoker     Quit date: 01/01/1954    Smokeless tobacco: Never Used    Alcohol Use: Yes  0.0 oz/week    0 Standard drinks or equivalent per week         Comment: glass of wine/beer1 time per week    Drug Use: No    Sexual Activity: Not on file ROS     GI/Hepatic/Renal - negative ROS     Endo/Other - negative ROS   Abdominal              Anesthesia Plan:   ASA:  3  Plan:   General  Post-op Pain Management: IV analgesics  Plan Comments: CV and post op cognitive deficit risks discussed with family

## 2017-04-14 NOTE — PHYSICAL THERAPY NOTE
PHYSICAL THERAPY TREATMENT NOTE - INPATIENT    Room Number: 334/334-A       Presenting Problem: acute pancreaitis    Problem List  Principal Problem:    Acute pancreatitis, unspecified complication status, unspecified pancreatitis type  Active Problems: bedclothes, sheets and blankets)?: A Little   -   Sitting down on and standing up from a chair with arms (e.g., wheelchair, bedside commode, etc.): A Little   -   Moving from lying on back to sitting on the side of the bed?: A Little   How much help from a supervision   Goal #4   Current Status NT   Goal #5 Patient to demonstrate independence with home activity/exercise instructions provided to patient in preparation for discharge. Goal #5   Current Status Pt performed HEP on B LE's 1x20 in sitting.     [facility]

## 2017-04-14 NOTE — ANESTHESIA POSTPROCEDURE EVALUATION
Patient: Violeta Sauceda    Procedure Summary     Date Anesthesia Start Anesthesia Stop Room / Location    04/14/17 1031  [facility] ENDOSCOPY 01 / [facility] ENDOSCOPY       Procedure Diagnosis Surgeon Responsible Provider    ESOPHAGOGASTRODUODENOSCOPY (EGD) (N/A ) (g

## 2017-04-15 PROCEDURE — 99233 SBSQ HOSP IP/OBS HIGH 50: CPT | Performed by: HOSPITALIST

## 2017-04-15 NOTE — PROGRESS NOTES
Merged with [facility] Heart Cardiology  Progress Note    [place] Patient Status:  Inpatient    1924 MRN [id number]   Location [facility] 3W/SW Attending Oralia Serrano MD   Hosp Day # 4 PCP Lynnea Babinski BID with holding parameters to hold if HR<65 and SBP <100                Recommendations:   Monitor BMP and hold furosemide at this time              Results:     Lab Results  Component Value Date   WBC 12.3* 04/15/2017   HGB 12.4 04/15/2017   HCT 38.8 04/

## 2017-04-15 NOTE — PROGRESS NOTES
[facility]    General Surgery Progress Note  [place]  : 1924  CSN: [id number]  HD# 4    Subjective:   POD#1 laparoscopic cholecystectomy   Denies complaints.  denies abdominal pain, nausea and vomiting  Passing flatus    Exam 135 04/15/2017   K 4.7 04/15/2017    04/15/2017   CO2 27 04/15/2017   BUN 22 04/15/2017   CREATSERUM 0.81 04/15/2017    04/15/2017   MG 2.4 04/15/2017   BILT 1.3 04/15/2017   AST 42 04/15/2017   ALT 26 04/15/2017   ROCIO 60 04/14/2017   LIP 21 0

## 2017-04-15 NOTE — PROGRESS NOTES
[place] [facility] - [facility]    Progress Note    [place] Patient Status:  Inpatient    1924 MRN [id number]   Location [facility] 3W/SW Attending Yuliana Rhodes MD   [facility] Day # 4 PCP Curly Ellsworth       Subjective:   [place] (cpt=74300)    4/14/2017  CONCLUSION:   1. Fluoroscopy time 29.6 seconds 2. 5 films from an intraoperative cholangiogram performed in the operating room by Dr. Viviane Omalley. 3. No evidence for common bile duct stone.  No significant dilatation of extrahepatic b

## 2017-04-16 PROCEDURE — 99239 HOSP IP/OBS DSCHRG MGMT >30: CPT | Performed by: HOSPITALIST

## 2017-04-16 RX ORDER — FUROSEMIDE 40 MG/1
20 TABLET ORAL DAILY
Qty: 30 TABLET | Refills: 2 | Status: SHIPPED | OUTPATIENT
Start: 2017-04-16 | End: 2017-04-17

## 2017-04-16 RX ORDER — SODIUM CHLORIDE 9 MG/ML
INJECTION, SOLUTION INTRAVENOUS
Status: COMPLETED
Start: 2017-04-16 | End: 2017-04-16

## 2017-04-16 RX ORDER — 0.9 % SODIUM CHLORIDE 0.9 %
VIAL (ML) INJECTION
Status: COMPLETED
Start: 2017-04-16 | End: 2017-04-16

## 2017-04-16 RX ORDER — HYDROCODONE BITARTRATE AND ACETAMINOPHEN 5; 325 MG/1; MG/1
1-2 TABLET ORAL EVERY 6 HOURS PRN
Qty: 30 TABLET | Refills: 0 | Status: SHIPPED | OUTPATIENT
Start: 2017-04-16 | End: 2017-04-25 | Stop reason: ALTCHOICE

## 2017-04-16 RX ORDER — POTASSIUM CHLORIDE 14.9 MG/ML
20 INJECTION INTRAVENOUS ONCE
Status: COMPLETED | OUTPATIENT
Start: 2017-04-16 | End: 2017-04-16

## 2017-04-16 NOTE — PROGRESS NOTES
[facility] AND CLINICS  Progress Note    [place] Patient Status:  Inpatient    1924 MRN [id number]   Location [facility] 3W/SW Attending Jarrell Peabody, MD   [address] Day # 5 PCP LIO DANIEL     Assessment:    Acute pancreatitis            dry.     Labs:    Lab Results  Component Value Date   WBC 9.3 04/16/2017   HGB 11.5 04/16/2017   HCT 35.6 04/16/2017    04/16/2017       Lab Results  Component Value Date   INR 1.1 04/15/2017       Lab Results  Component Value Date    04/16/20

## 2017-04-16 NOTE — PROGRESS NOTES
[facility]    General Surgery Progress Note  [place]  : 1924  CSN: [id number]  HD# 5    Subjective:   POD#2 laparoscopic cholecystectomy   Denies complaints.  denies abdominal pain, nausea and vomiting  Passing flatus    Exam  04/16/2017    04/16/2017   K 3.8 04/16/2017    04/16/2017   CO2 25 04/16/2017   BUN 25 04/16/2017   CREATSERUM 0.88 04/16/2017   GLU 94 04/16/2017   BILT 1.0 04/16/2017   AST 34 04/16/2017   ALT 18 04/16/2017   LIP 27 04/16/2017   CA

## 2017-04-16 NOTE — DISCHARGE SUMMARY
[facility]  DISCHARGE SUMMARY     [place] Patient Status:  Inpatient    1924 MRN [id number]   [facility] 3W/SW Attending Claudette Harsh, MD   Hosp Day # 5 PCP Hussein Cheung     Date of Admission: 2017  Date o BB dose decreased.      Ch. Diastolic Heart Failure  -gerald cards eval  -monitor fluid status closely  -echo for pre-op clearance- normal systolic heart  -Lasix dose decreased to 20mg daily     Dementia  -cont namenda  -family at bedside and supportive    SQ medication strength  - how much to take        Take 0.5 tablets (12.5 mg total) by mouth 2 (two) times daily.     Stop taking on:  5/16/2017   Quantity:  30 tablet   Refills:  0         CONTINUE taking these medications       Instructions Prescription jerel No rebound or guarding. Neurologic: No focal neurological deficits. Musculoskeletal: Moves all extremities. Extremities: No edema.   -----------------------------------------------------------------------------------------------  PATIENT DISCHARGE INSTR after surgery  · After surgery, take it easy for the rest of the day. If you had general anesthesia, don’t use machinery or power tools, drink alcohol, or make any major decisions for at least the first 24 hours.   · Don’t drive while you are still taking o HMOs will be handled by a member of the Care Management Team.  For all other patients, please follow usual protocol for discharge care transition.     Risk of readmission: [address] Elizondo has Moderate Risk of readmission after discharge from the hospital.

## 2017-04-17 VITALS
HEIGHT: 62.01 IN | TEMPERATURE: 98 F | BODY MASS INDEX: 24.46 KG/M2 | DIASTOLIC BLOOD PRESSURE: 51 MMHG | OXYGEN SATURATION: 94 % | RESPIRATION RATE: 20 BRPM | HEART RATE: 78 BPM | SYSTOLIC BLOOD PRESSURE: 113 MMHG | WEIGHT: 134.63 LBS

## 2017-04-17 PROCEDURE — 99232 SBSQ HOSP IP/OBS MODERATE 35: CPT | Performed by: HOSPITALIST

## 2017-04-17 RX ORDER — ARIPIPRAZOLE 15 MG/1
40 TABLET ORAL ONCE
Status: COMPLETED | OUTPATIENT
Start: 2017-04-17 | End: 2017-04-17

## 2017-04-17 RX ORDER — FUROSEMIDE 20 MG/1
20 TABLET ORAL DAILY
Qty: 30 TABLET | Refills: 2 | Status: SHIPPED | OUTPATIENT
Start: 2017-04-17 | End: 2017-05-01

## 2017-04-17 NOTE — PHYSICAL THERAPY NOTE
PHYSICAL THERAPY TREATMENT NOTE - INPATIENT    Room Number: 334/334-A       Presenting Problem: acute pancreaitis    Problem List  Principal Problem:    Acute pancreatitis, unspecified complication status, unspecified pancreatitis type  Active Problems: Yolette    AM-PAC Score:  Raw Score: 23   PT Approx Degree of Impairment Score: 11.2%   Standardized Score (AM-PAC Scale): 56.93   CMS Modifier (G-Code): CI    FUNCTIONAL ABILITY STATUS  Gait Assessment   Gait Assistance: Modified independent  Distance (ft)

## 2017-04-17 NOTE — PROGRESS NOTES
[facility] Heart Cardiology  Progress Note    [place] Patient Status:  Inpatient    1924 MRN [id number]   Location [facility] 3W/SW Attending Clarice Dunaway MD   Hosp Day # 6 PCP Uri Sung Selam outpatient   Follow up with Dr Darrell Thornton in 2-3 weeks    Results:     Lab Results  Component Value Date   WBC 9.3 04/16/2017   HGB 11.5* 04/16/2017   HCT 35.6 04/16/2017    04/16/2017   CREATSERUM 0.88 04/16/2017   BUN 25* 04/16/2017   *

## 2017-04-17 NOTE — PROGRESS NOTES
[facility]    General Surgery Progress Note  [place]  : 1924  CSN: [id number]  HD# 6    Subjective:   POD#3 laparoscopic cholecystectomy   Denies complaints.  denies abdominal pain, nausea and vomiting  Passing flatus    Exam --          Lab Results  Component Value Date   K 3.7 04/17/2017               Lucian Graff MD FACS  04/17/2017  9:07 AM

## 2017-04-17 NOTE — PROGRESS NOTES
[facility] HOSPITALIST  Progress Note     [facility] Patient Status:  Inpatient    1924 MRN [id number]   Location [facility] 3W/SW Attending Clarice Dunaway MD   Hosp Day # 6 PCP Uri Sung     Chief Complaint: Abd pain     S: Patient Metoprolol Tartrate  12.5 mg Oral BID     HYDROcodone-acetaminophen **OR** HYDROcodone-acetaminophen, HYDROmorphone HCl PF **OR** HYDROmorphone HCl PF, ondansetron HCl, acetaminophen, morphINE sulfate (PF) **OR** morphINE sulfate (PF), ondansetron HCl, Met

## 2017-04-19 ENCOUNTER — PRIOR ORIGINAL RECORDS (OUTPATIENT)
Dept: OTHER | Age: 82
End: 2017-04-19

## 2017-04-21 ENCOUNTER — PRIOR ORIGINAL RECORDS (OUTPATIENT)
Dept: OTHER | Age: 82
End: 2017-04-21

## 2017-04-25 PROBLEM — K85.90 ACUTE PANCREATITIS: Status: RESOLVED | Noted: 2017-04-12 | Resolved: 2017-04-25

## 2017-04-25 PROBLEM — K85.90 ACUTE PANCREATITIS, UNSPECIFIED COMPLICATION STATUS, UNSPECIFIED PANCREATITIS TYPE: Status: RESOLVED | Noted: 2017-04-12 | Resolved: 2017-04-25

## 2017-05-01 ENCOUNTER — OFFICE VISIT (OUTPATIENT)
Dept: CARDIOLOGY CLINIC | Facility: CLINIC | Age: 82
End: 2017-05-01

## 2017-05-01 VITALS
HEART RATE: 72 BPM | DIASTOLIC BLOOD PRESSURE: 60 MMHG | WEIGHT: 125 LBS | SYSTOLIC BLOOD PRESSURE: 98 MMHG | RESPIRATION RATE: 18 BRPM | HEIGHT: 64 IN | BODY MASS INDEX: 21.34 KG/M2

## 2017-05-01 DIAGNOSIS — R60.9 EDEMA, UNSPECIFIED TYPE: ICD-10-CM

## 2017-05-01 DIAGNOSIS — I50.32 DIASTOLIC DYSFUNCTION WITH CHRONIC HEART FAILURE (HCC): Primary | ICD-10-CM

## 2017-05-01 PROCEDURE — 99214 OFFICE O/P EST MOD 30 MIN: CPT | Performed by: NURSE PRACTITIONER

## 2017-05-01 PROCEDURE — G0463 HOSPITAL OUTPT CLINIC VISIT: HCPCS | Performed by: NURSE PRACTITIONER

## 2017-05-01 RX ORDER — FUROSEMIDE 40 MG/1
40 TABLET ORAL DAILY
Qty: 30 TABLET | Refills: 3 | Status: SHIPPED | OUTPATIENT
Start: 2017-05-01 | End: 2017-09-22

## 2017-05-01 RX ORDER — FUROSEMIDE 40 MG/1
40 TABLET ORAL DAILY
COMMUNITY
End: 2017-05-01

## 2017-05-01 NOTE — PROGRESS NOTES
[place] is a 80year old female. Patient presents with:  Atrial Fibrillation  Hypertension  CHF  Edema: lower extremity edema    HPI:   Patient comes in today for a hospital follow-up.   She is a history of diastolic dysfunction chronic A. fib not c Alcohol Use: Yes           0.0 oz/week       0 Standard drinks or equivalent per week       Comment: glass of wine/beer1 time per week       REVIEW OF SYSTEMS:   GENERAL HEALTH: feels well otherwise  SKIN: denies any unusual skin lesions or rashes  RE

## 2017-05-01 NOTE — PATIENT INSTRUCTIONS
1. Elevate feet for 30 min twice a day  2. Cut down down on salt  3. Blood test in 1-2 wks  4.  See Dr. Rylee Rojas in 1 month

## 2017-05-25 ENCOUNTER — TELEPHONE (OUTPATIENT)
Dept: FAMILY MEDICINE CLINIC | Facility: CLINIC | Age: 82
End: 2017-05-25

## 2017-07-17 ENCOUNTER — TELEPHONE (OUTPATIENT)
Dept: CARDIOLOGY CLINIC | Facility: CLINIC | Age: 82
End: 2017-07-17

## 2017-07-17 NOTE — TELEPHONE ENCOUNTER
Metoprolol Tartrate 25mg tabs, take 1/2 tab (12.5mg) by mouth twice daily, qty 30    Current Outpatient Prescriptions:   •  metoprolol Tartrate 25 MG Oral Tab, Take 0.5 tablets (12.5 mg total) by mouth 2 (two) times daily. , Disp: 30 tablet, Rfl: 0

## 2017-07-27 ENCOUNTER — APPOINTMENT (OUTPATIENT)
Dept: GENERAL RADIOLOGY | Facility: HOSPITAL | Age: 82
End: 2017-07-27
Attending: EMERGENCY MEDICINE
Payer: MEDICARE

## 2017-07-27 ENCOUNTER — HOSPITAL ENCOUNTER (OUTPATIENT)
Facility: HOSPITAL | Age: 82
Setting detail: OBSERVATION
LOS: 1 days | Discharge: HOME | End: 2017-08-01
Attending: EMERGENCY MEDICINE | Admitting: HOSPITALIST
Payer: MEDICARE

## 2017-07-27 ENCOUNTER — APPOINTMENT (OUTPATIENT)
Dept: CT IMAGING | Facility: HOSPITAL | Age: 82
End: 2017-07-27
Attending: EMERGENCY MEDICINE
Payer: MEDICARE

## 2017-07-27 DIAGNOSIS — S32.9XXA CLOSED NONDISPLACED FRACTURE OF PELVIS, UNSPECIFIED PART OF PELVIS, INITIAL ENCOUNTER (HCC): Primary | ICD-10-CM

## 2017-07-27 PROCEDURE — 72192 CT PELVIS W/O DYE: CPT | Performed by: EMERGENCY MEDICINE

## 2017-07-27 PROCEDURE — 73030 X-RAY EXAM OF SHOULDER: CPT | Performed by: EMERGENCY MEDICINE

## 2017-07-27 PROCEDURE — 73502 X-RAY EXAM HIP UNI 2-3 VIEWS: CPT | Performed by: EMERGENCY MEDICINE

## 2017-07-27 RX ORDER — ACETAMINOPHEN 325 MG/1
650 TABLET ORAL ONCE
Status: COMPLETED | OUTPATIENT
Start: 2017-07-27 | End: 2017-07-27

## 2017-07-28 ENCOUNTER — APPOINTMENT (OUTPATIENT)
Dept: GENERAL RADIOLOGY | Facility: HOSPITAL | Age: 82
End: 2017-07-28
Attending: HOSPITALIST
Payer: MEDICARE

## 2017-07-28 PROBLEM — S32.9XXA CLOSED NONDISPLACED FRACTURE OF PELVIS (HCC): Status: ACTIVE | Noted: 2017-07-28

## 2017-07-28 PROBLEM — S32.9XXA CLOSED NONDISPLACED FRACTURE OF PELVIS, UNSPECIFIED PART OF PELVIS, INITIAL ENCOUNTER (HCC): Status: ACTIVE | Noted: 2017-07-28

## 2017-07-28 LAB
ANION GAP SERPL CALC-SCNC: 5 MMOL/L (ref 0–18)
ANION GAP SERPL CALC-SCNC: 5 MMOL/L (ref 0–18)
BASOPHILS # BLD: 0 K/UL (ref 0–0.2)
BASOPHILS # BLD: 0.1 K/UL (ref 0–0.2)
BASOPHILS NFR BLD: 1 %
BASOPHILS NFR BLD: 1 %
BUN SERPL-MCNC: 18 MG/DL (ref 8–20)
BUN SERPL-MCNC: 20 MG/DL (ref 8–20)
BUN/CREAT SERPL: 24.7 (ref 10–20)
BUN/CREAT SERPL: 28.1 (ref 10–20)
CALCIUM SERPL-MCNC: 7.9 MG/DL (ref 8.5–10.5)
CALCIUM SERPL-MCNC: 8.1 MG/DL (ref 8.5–10.5)
CHLORIDE SERPL-SCNC: 102 MMOL/L (ref 95–110)
CHLORIDE SERPL-SCNC: 105 MMOL/L (ref 95–110)
CO2 SERPL-SCNC: 26 MMOL/L (ref 22–32)
CO2 SERPL-SCNC: 27 MMOL/L (ref 22–32)
CREAT SERPL-MCNC: 0.64 MG/DL (ref 0.5–1.5)
CREAT SERPL-MCNC: 0.81 MG/DL (ref 0.5–1.5)
EOSINOPHIL # BLD: 0 K/UL (ref 0–0.7)
EOSINOPHIL # BLD: 0.1 K/UL (ref 0–0.7)
EOSINOPHIL NFR BLD: 0 %
EOSINOPHIL NFR BLD: 1 %
ERYTHROCYTE [DISTWIDTH] IN BLOOD BY AUTOMATED COUNT: 14.5 % (ref 11–15)
ERYTHROCYTE [DISTWIDTH] IN BLOOD BY AUTOMATED COUNT: 14.7 % (ref 11–15)
GLUCOSE SERPL-MCNC: 104 MG/DL (ref 70–99)
GLUCOSE SERPL-MCNC: 115 MG/DL (ref 70–99)
HCT VFR BLD AUTO: 34.3 % (ref 35–48)
HCT VFR BLD AUTO: 35.1 % (ref 35–48)
HGB BLD-MCNC: 11.1 G/DL (ref 12–16)
HGB BLD-MCNC: 11.3 G/DL (ref 12–16)
INR BLD: 1 (ref 0.9–1.2)
LYMPHOCYTES # BLD: 0.5 K/UL (ref 1–4)
LYMPHOCYTES # BLD: 0.6 K/UL (ref 1–4)
LYMPHOCYTES NFR BLD: 7 %
LYMPHOCYTES NFR BLD: 7 %
MCH RBC QN AUTO: 27.1 PG (ref 27–32)
MCH RBC QN AUTO: 27.2 PG (ref 27–32)
MCHC RBC AUTO-ENTMCNC: 32.1 G/DL (ref 32–37)
MCHC RBC AUTO-ENTMCNC: 32.3 G/DL (ref 32–37)
MCV RBC AUTO: 84.2 FL (ref 80–100)
MCV RBC AUTO: 84.4 FL (ref 80–100)
MONOCYTES # BLD: 0.5 K/UL (ref 0–1)
MONOCYTES # BLD: 0.6 K/UL (ref 0–1)
MONOCYTES NFR BLD: 7 %
MONOCYTES NFR BLD: 7 %
NEUTROPHILS # BLD AUTO: 6 K/UL (ref 1.8–7.7)
NEUTROPHILS # BLD AUTO: 7.7 K/UL (ref 1.8–7.7)
NEUTROPHILS NFR BLD: 84 %
NEUTROPHILS NFR BLD: 85 %
OSMOLALITY UR CALC.SUM OF ELEC: 281 MOSM/KG (ref 275–295)
OSMOLALITY UR CALC.SUM OF ELEC: 285 MOSM/KG (ref 275–295)
PLATELET # BLD AUTO: 203 K/UL (ref 140–400)
PLATELET # BLD AUTO: 207 K/UL (ref 140–400)
PMV BLD AUTO: 8.5 FL (ref 7.4–10.3)
PMV BLD AUTO: 8.5 FL (ref 7.4–10.3)
POTASSIUM SERPL-SCNC: 4 MMOL/L (ref 3.3–5.1)
POTASSIUM SERPL-SCNC: 4.4 MMOL/L (ref 3.3–5.1)
PROTHROMBIN TIME: 13 SECONDS (ref 11.8–14.5)
RBC # BLD AUTO: 4.07 M/UL (ref 3.7–5.4)
RBC # BLD AUTO: 4.15 M/UL (ref 3.7–5.4)
SODIUM SERPL-SCNC: 134 MMOL/L (ref 136–144)
SODIUM SERPL-SCNC: 136 MMOL/L (ref 136–144)
WBC # BLD AUTO: 7.1 K/UL (ref 4–11)
WBC # BLD AUTO: 9.1 K/UL (ref 4–11)

## 2017-07-28 PROCEDURE — 99222 1ST HOSP IP/OBS MODERATE 55: CPT | Performed by: HOSPITALIST

## 2017-07-28 PROCEDURE — 71010 XR CHEST AP PORTABLE  (CPT=71010): CPT | Performed by: HOSPITALIST

## 2017-07-28 RX ORDER — MEMANTINE HYDROCHLORIDE 10 MG/1
10 TABLET ORAL 2 TIMES DAILY
Status: DISCONTINUED | OUTPATIENT
Start: 2017-07-28 | End: 2017-08-01

## 2017-07-28 RX ORDER — ACETAMINOPHEN 325 MG/1
650 TABLET ORAL EVERY 6 HOURS PRN
Status: DISCONTINUED | OUTPATIENT
Start: 2017-07-28 | End: 2017-08-01

## 2017-07-28 RX ORDER — TRAMADOL HYDROCHLORIDE 50 MG/1
50 TABLET ORAL EVERY 6 HOURS PRN
Status: DISCONTINUED | OUTPATIENT
Start: 2017-07-28 | End: 2017-08-01

## 2017-07-28 RX ORDER — ONDANSETRON 2 MG/ML
4 INJECTION INTRAMUSCULAR; INTRAVENOUS EVERY 6 HOURS PRN
Status: DISCONTINUED | OUTPATIENT
Start: 2017-07-28 | End: 2017-08-01

## 2017-07-28 RX ORDER — KETOROLAC TROMETHAMINE 15 MG/ML
15 INJECTION, SOLUTION INTRAMUSCULAR; INTRAVENOUS EVERY 6 HOURS PRN
Status: DISCONTINUED | OUTPATIENT
Start: 2017-07-28 | End: 2017-07-28

## 2017-07-28 RX ORDER — PANTOPRAZOLE SODIUM 40 MG/1
40 TABLET, DELAYED RELEASE ORAL
Status: DISCONTINUED | OUTPATIENT
Start: 2017-07-28 | End: 2017-08-01

## 2017-07-28 RX ORDER — LIDOCAINE 50 MG/G
1 PATCH TOPICAL EVERY 24 HOURS
Status: DISCONTINUED | OUTPATIENT
Start: 2017-07-28 | End: 2017-08-01

## 2017-07-28 RX ORDER — HEPARIN SODIUM 5000 [USP'U]/ML
5000 INJECTION, SOLUTION INTRAVENOUS; SUBCUTANEOUS EVERY 8 HOURS SCHEDULED
Status: DISCONTINUED | OUTPATIENT
Start: 2017-07-28 | End: 2017-08-01

## 2017-07-28 NOTE — PLAN OF CARE
Problem: Patient Centered Care  Goal: Patient preferences are identified and integrated in the patient's plan of care  Interventions:  - What would you like us to know as we care for you?  Involve family with plan of care  - Provide timely, complete, and ac evaluate response  - Implement non-pharmacological measures as appropriate and evaluate response  - Consider cultural and social influences on pain and pain management  - Manage/alleviate anxiety  - Utilize distraction and/or relaxation techniques  - Monit

## 2017-07-28 NOTE — CM/SW NOTE
Observation notice explained to patient and family, signed by patients daughter who is patients POA at the Bed Side

## 2017-07-28 NOTE — PROGRESS NOTES
Post midnight follow up     Right inferior pubic rami fracture, nondisplaced  PT/OT  Continue pain meds      Paroxysmal A. fib  Not on anticoagulation secondary to fall risk, rate controlled at this time will continue metoprolol.     Benign hypertension  C

## 2017-07-28 NOTE — PHYSICAL THERAPY NOTE
PHYSICAL THERAPY EVALUATION - INPATIENT     Room Number: 548/548-A  Evaluation Date: 7/28/2017  Type of Evaluation: Initial  Physician Order: PT Eval and Treat    Presenting Problem: Minimally displaced right inferior pubic rami fx  Reason for Therapy: education;Gait training;Strengthening;Transfer training;Balance training  Rehab Potential : Fair  Frequency (Obs): Daily       PHYSICAL THERAPY MEDICAL/SOCIAL HISTORY     History related to current admission: Patient with hx of dementia, Afib, CHF, and HTN promotion; Body mechanics;Repositioning    COGNITION  · Orientation Level:  oriented to place, oriented to person and disoriented to time  · Following Commands:  follows multi-step commands inconsistently, follows one step commands with increased time and f light within reach;RN aware of session/findings; All patient questions and concerns addressed; Family present    CURRENT GOALS    Goals to be met by: 8/5/17  Patient Goal Patient's self-stated goal is: to go home    Goal #1 Patient is able to demonstrate sup

## 2017-07-28 NOTE — ED INITIAL ASSESSMENT (HPI)
Mechanical fall, \"missed step\" while going down steps. C/o right hip pain. A&Ox4, denies head/neck pain. No LOC.

## 2017-07-28 NOTE — ED NOTES
Pt has no IV access, Dr. Jethro Martinez made aware and stated that pt does not need IV access at this time

## 2017-07-28 NOTE — ED PROVIDER NOTES
Patient Seen in: [facility] AND [facility] Emergency Department    History   Patient presents with:  Fall (musculoskeletal, neurologic)    Stated Complaint:     HPI    80-year-old female with history of dementia presents for evaluation after a fall.   Patient mis Comment: glass of wine/beer1 time per week      Review of Systems    Positive for stated complaint:   Other systems are as noted in HPI. Constitutional and vital signs reviewed. All other systems reviewed and negative except as noted above.     PSFH e Course  ------------------------------------------------------------  MDM   Differential diagnosis includes fracture, dislocation, contusion    CT confirms slightly displaced fracture of the right inferior pubic ramus.   Discussed with and admitted to Dr. Waylon Villalpando

## 2017-07-28 NOTE — PLAN OF CARE
Problem: Patient Centered Care  Goal: Patient preferences are identified and integrated in the patient's plan of care  Interventions:  - What would you like us to know as we care for you?  Involve family with plan of care  - Provide timely, complete, and ac anxiety  - Utilize distraction and/or relaxation techniques  - Monitor for opioid side effects  - Notify MD/LIP if interventions unsuccessful or patient reports new pain   Outcome: Progressing      Problem: Patient/Family Goals  Goal: Patient/Family Long T

## 2017-07-28 NOTE — H&P
[address] [place] [facility] Patient Status:  Emergency    1924 MRN [id number]   Location [address] Attending Edwin Bull MD   Hosp Day # 0 JOSSE Galindo     Date:   (two) times daily. Patient taking differently: Take 10 mg by mouth daily. Potassium Chloride ER (K-DUR) 10 MEQ Oral Tab CR   Yes No   Sig: Take  by mouth daily. furosemide 40 MG Oral Tab   No No   Sig: Take 1 tablet (40 mg total) by mouth daily. Cooperative, appropriate mood & affect. Laboratory Data:        Imaging:  No exam resulted this encounter. Assessment and Plan:    Right inferior pubic rami fracture, nondisplaced  We will use pain medication as needed, PT to evaluate.   Will likely

## 2017-07-28 NOTE — OCCUPATIONAL THERAPY NOTE
OCCUPATIONAL THERAPY EVALUATION - INPATIENT      Room Number: 548/548-A  Evaluation Date: 7/28/2017  Type of Evaluation: Initial  Presenting Problem:  (right pubic rami fracture)    Physician Order: IP Consult to Occupational Therapy  Reason for Therapy: A 2/18/2015   • Unspecified essential hypertension        Past Surgical History  Past Surgical History:  No date: APPENDECTOMY  4/14/17 : CHOLECYSTECTOMY  No date: COLECTOMY  1988: COLON SURGERY  No date: TONSILLECTOMY    HOME SITUATION  Type of Home: House None    AM-PAC Score:  Score: 16  Approx Degree of Impairment: 53.32%  Standardized Score (AM-PAC Scale): 35.96  CMS Modifier (G-Code): CK    FUNCTIONAL TRANSFER ASSESSMENT  Supine to Sit : Maximum assistance (x2)  Sit to Stand:  Moderate assistance    Toil

## 2017-07-28 NOTE — ED NOTES
Report given to Leslie Zabala RN, receiving RN made aware that pt has no IV access as per directed by Dr. Foy Babinski, receiving RN is not concern about IV access

## 2017-07-28 NOTE — DISCHARGE PLANNING
Pt has a pelvic fracture and decision was made by Physician Advisor to switch the pt to Observation status. Therapy assessment indicates the pt is now Max x2 for transfers.  CLARA sent textpage to MD to request a PMR consult, as pt may be appropriate for acute

## 2017-07-29 ENCOUNTER — APPOINTMENT (OUTPATIENT)
Dept: CT IMAGING | Facility: HOSPITAL | Age: 82
End: 2017-07-29
Attending: HOSPITALIST
Payer: MEDICARE

## 2017-07-29 LAB
ALBUMIN SERPL BCP-MCNC: 2.2 G/DL (ref 3.5–4.8)
ANION GAP SERPL CALC-SCNC: 5 MMOL/L (ref 0–18)
BASOPHILS # BLD: 0.1 K/UL (ref 0–0.2)
BASOPHILS NFR BLD: 1 %
BUN SERPL-MCNC: 16 MG/DL (ref 8–20)
BUN/CREAT SERPL: 18.2 (ref 10–20)
CALCIUM SERPL-MCNC: 7.9 MG/DL (ref 8.5–10.5)
CHLORIDE SERPL-SCNC: 101 MMOL/L (ref 95–110)
CO2 SERPL-SCNC: 28 MMOL/L (ref 22–32)
CREAT SERPL-MCNC: 0.88 MG/DL (ref 0.5–1.5)
EOSINOPHIL # BLD: 0.1 K/UL (ref 0–0.7)
EOSINOPHIL NFR BLD: 2 %
ERYTHROCYTE [DISTWIDTH] IN BLOOD BY AUTOMATED COUNT: 14.5 % (ref 11–15)
GLUCOSE SERPL-MCNC: 101 MG/DL (ref 70–99)
HCT VFR BLD AUTO: 35 % (ref 35–48)
HGB BLD-MCNC: 11.2 G/DL (ref 12–16)
LYMPHOCYTES # BLD: 0.5 K/UL (ref 1–4)
LYMPHOCYTES NFR BLD: 7 %
MCH RBC QN AUTO: 27.1 PG (ref 27–32)
MCHC RBC AUTO-ENTMCNC: 31.9 G/DL (ref 32–37)
MCV RBC AUTO: 84.7 FL (ref 80–100)
MONOCYTES # BLD: 0.6 K/UL (ref 0–1)
MONOCYTES NFR BLD: 8 %
NEUTROPHILS # BLD AUTO: 5.8 K/UL (ref 1.8–7.7)
NEUTROPHILS NFR BLD: 83 %
OSMOLALITY UR CALC.SUM OF ELEC: 279 MOSM/KG (ref 275–295)
PLATELET # BLD AUTO: 192 K/UL (ref 140–400)
PMV BLD AUTO: 8.9 FL (ref 7.4–10.3)
POTASSIUM SERPL-SCNC: 4.3 MMOL/L (ref 3.3–5.1)
RBC # BLD AUTO: 4.13 M/UL (ref 3.7–5.4)
SODIUM SERPL-SCNC: 134 MMOL/L (ref 136–144)
WBC # BLD AUTO: 6.9 K/UL (ref 4–11)

## 2017-07-29 PROCEDURE — 71260 CT THORAX DX C+: CPT | Performed by: HOSPITALIST

## 2017-07-29 PROCEDURE — 99226 SUBSEQUENT OBSERVATION CARE: CPT | Performed by: HOSPITALIST

## 2017-07-29 RX ORDER — 0.9 % SODIUM CHLORIDE 0.9 %
VIAL (ML) INJECTION
Status: COMPLETED
Start: 2017-07-29 | End: 2017-07-29

## 2017-07-29 RX ORDER — FUROSEMIDE 40 MG/1
40 TABLET ORAL DAILY
Status: DISCONTINUED | OUTPATIENT
Start: 2017-07-29 | End: 2017-08-01

## 2017-07-29 RX ORDER — FUROSEMIDE 10 MG/ML
10 INJECTION INTRAMUSCULAR; INTRAVENOUS ONCE
Status: COMPLETED | OUTPATIENT
Start: 2017-07-29 | End: 2017-07-29

## 2017-07-29 NOTE — PHYSICAL THERAPY NOTE
PHYSICAL THERAPY TREATMENT NOTE - INPATIENT    Room Number: 548/548-A       Presenting Problem: Minimally displaced right inferior pubic rami fx    Problem List  Principal Problem:    Closed nondisplaced fracture of pelvis, unspecified part of pelvis, ini rate  Location:  (Right hip and pelvic region)  Management Techniques:  Activity promotion;Repositioning;Relaxation    BALANCE                                                                                                                     Static Sitting supervision with walker - rolling     Goal #2  Current Status Mod A x 2 on 7/29.    Goal #3 Patient is able to ambulate 10 feet with assist device: walker - rolling at assistance level: supervision   Goal #3   Current Status 5-6 small steps with RW and mod

## 2017-07-29 NOTE — PLAN OF CARE
Problem: Patient Centered Care  Goal: Patient preferences are identified and integrated in the patient's plan of care  Interventions:  - What would you like us to know as we care for you?  Involve family with plan of care  - Provide timely, complete, and ac response  - Consider cultural and social influences on pain and pain management  - Manage/alleviate anxiety  - Utilize distraction and/or relaxation techniques  - Monitor for opioid side effects  - Notify MD/LIP if interventions unsuccessful or patient rep appropriate  - Consider OT/PT consult to assist with strengthening/mobility  - Encourage toileting schedule  Outcome: Progressing  Pt's bed is in the lowest position; the safety socks are on; the call light is within reach; and the bed and chair alarms are

## 2017-07-29 NOTE — PROGRESS NOTES
[facility]    Progress Note    [place] Patient Status:  Observation    1924 MRN [id number]   [facility] 5SW/SE Attending Aleksandr Sutton MD   Hosp Day # 1 PCP Bashir Galindo       Subjective:   [place] was submitted by the [employer] teleradiologist and there are no significant discrepancies. Xr Chest Ap Portable  (cpt=71010)    Result Date: 7/29/2017  CONCLUSION: Moderate interstitial edema.    Preliminary report was submitted by the [employer] teleradiolog initial encounter [facility])  - PT/OT recc LORENZO  Social work on consult   Pain control with tramadol and lidoderm patch    Paroxysmal A. fib  Not on anticoagulation secondary to fall risk, rate controlled at this time will continue metoprolol.     Benign hyperte

## 2017-07-30 LAB
ANION GAP SERPL CALC-SCNC: 7 MMOL/L (ref 0–18)
BASOPHILS # BLD: 0.1 K/UL (ref 0–0.2)
BASOPHILS NFR BLD: 1 %
BUN SERPL-MCNC: 16 MG/DL (ref 8–20)
BUN/CREAT SERPL: 20.3 (ref 10–20)
CALCIUM SERPL-MCNC: 7.9 MG/DL (ref 8.5–10.5)
CHLORIDE SERPL-SCNC: 102 MMOL/L (ref 95–110)
CO2 SERPL-SCNC: 28 MMOL/L (ref 22–32)
CREAT SERPL-MCNC: 0.79 MG/DL (ref 0.5–1.5)
EOSINOPHIL # BLD: 0.2 K/UL (ref 0–0.7)
EOSINOPHIL NFR BLD: 3 %
ERYTHROCYTE [DISTWIDTH] IN BLOOD BY AUTOMATED COUNT: 14.2 % (ref 11–15)
GLUCOSE SERPL-MCNC: 94 MG/DL (ref 70–99)
HCT VFR BLD AUTO: 34.3 % (ref 35–48)
HGB BLD-MCNC: 10.9 G/DL (ref 12–16)
LYMPHOCYTES # BLD: 0.5 K/UL (ref 1–4)
LYMPHOCYTES NFR BLD: 7 %
MCH RBC QN AUTO: 27 PG (ref 27–32)
MCHC RBC AUTO-ENTMCNC: 31.8 G/DL (ref 32–37)
MCV RBC AUTO: 84.7 FL (ref 80–100)
MONOCYTES # BLD: 0.6 K/UL (ref 0–1)
MONOCYTES NFR BLD: 9 %
NEUTROPHILS # BLD AUTO: 5.8 K/UL (ref 1.8–7.7)
NEUTROPHILS NFR BLD: 80 %
OSMOLALITY UR CALC.SUM OF ELEC: 285 MOSM/KG (ref 275–295)
PLATELET # BLD AUTO: 196 K/UL (ref 140–400)
PMV BLD AUTO: 8.9 FL (ref 7.4–10.3)
POTASSIUM SERPL-SCNC: 3.4 MMOL/L (ref 3.3–5.1)
RBC # BLD AUTO: 4.05 M/UL (ref 3.7–5.4)
SODIUM SERPL-SCNC: 137 MMOL/L (ref 136–144)
WBC # BLD AUTO: 7.2 K/UL (ref 4–11)

## 2017-07-30 PROCEDURE — 99226 SUBSEQUENT OBSERVATION CARE: CPT | Performed by: HOSPITALIST

## 2017-07-30 RX ORDER — FUROSEMIDE 10 MG/ML
20 INJECTION INTRAMUSCULAR; INTRAVENOUS ONCE
Status: COMPLETED | OUTPATIENT
Start: 2017-07-30 | End: 2017-07-30

## 2017-07-30 RX ORDER — 0.9 % SODIUM CHLORIDE 0.9 %
VIAL (ML) INJECTION
Status: COMPLETED
Start: 2017-07-30 | End: 2017-07-30

## 2017-07-30 NOTE — DISCHARGE PLANNING
Pt is here under observation w/ Medicare. If SNF is recommended, pt would have to pay out of pocket as Medicare will not cover SNF stay under observation. PMR to evalute pt - SW to follow up on recommendations.     Susie Share, [address]

## 2017-07-30 NOTE — PHYSICAL THERAPY NOTE
PHYSICAL THERAPY TREATMENT NOTE - INPATIENT    Room Number: 548/548-A     Session: PT treatment       Presenting Problem: Minimally displaced right inferior pubic rami fx    Problem List  Principal Problem:    Closed nondisplaced fracture of pelvis, unspe Sitting down on and standing up from a chair with arms (e.g., wheelchair, bedside commode, etc.): A Lot   -   Moving from lying on back to sitting on the side of the bed?: A Lot   How much help from another person does the patient currently need. ..   -   M Potential : Fair  Frequency (Obs): Daily    CURRENT GOALS     Patient Goal Patient's self-stated goal is: to go home    Goal #1 Patient is able to demonstrate supine - sit EOB @ level: supervision   Goal #1   Current Status     Goal #2 Patient is able to d

## 2017-07-30 NOTE — CONSULTS
[facility]    PATIENT'S NAME: Kuldip Roy   ATTENDING PHYSICIAN: Suzanna Cummins MD   CONSULTING PHYSICIAN: Kaila Arevalo MD   PATIENT ACCOUNT#:   [id number]    LOCATION:  [address] [place] [address] RECORD #:   [id number]       DATE OF BIRTH:  [birth year]/ family and a caregiver who comes in about 15 hours a week to help her with self-care, bathing, dressing. She lives in a multi-level house. There are 6 steps to enter. FAMILY HISTORY:  Reviewed with the patient.   Response is noncontributory from phys transfers. Would benefit from subacute rehab setting for 4 weeks for functional upgrading to improve to a supervision level. Rehab potential is fair. The patient is in agreement with this outlined plan of care.     Thank you for allowing me to participat

## 2017-07-30 NOTE — CONSULTS
Patient seen and examined.  Consult dictated [id number]  Recommend LORENZO x 4 weeks    Thank you    Nanci Duran MD  [facility]

## 2017-07-30 NOTE — PROGRESS NOTES
[facility]    Progress Note    [place] Patient Status:  Observation    1924 MRN [id number]   [facility] 5SW/SE Attending Darrion Wise MD   Hosp Day # 1 PCP Paramjit Colindres       Subjective:   [place] Preliminary report was submitted by the [employer] teleradiologist and there are no significant discrepancies. Xr Chest Ap Portable  (cpt=71010)    Result Date: 7/29/2017  CONCLUSION: Moderate interstitial edema.    Preliminary report was submitted by the unspecified part of pelvis, initial encounter [facility])  - PT/OT recc LORENZO  Social work on consult   Pain control with tylenol and lidoderm patch  Hasn't been getting the tramadol    Paroxysmal A. fib  Not on anticoagulation secondary to fall risk, rate control

## 2017-07-30 NOTE — PLAN OF CARE
Problem: Patient Centered Care  Goal: Patient preferences are identified and integrated in the patient's plan of care  Interventions:  - What would you like us to know as we care for you?  Involve family with plan of care  - Provide timely, complete, and ac response  - Consider cultural and social influences on pain and pain management  - Manage/alleviate anxiety  - Utilize distraction and/or relaxation techniques  - Monitor for opioid side effects  - Notify MD/LIP if interventions unsuccessful or patient rep appropriate  - Consider OT/PT consult to assist with strengthening/mobility  - Encourage toileting schedule   Outcome: Progressing  Pt's bed is in the lowest position; the safety socks are on; the call light is within reach; the bed and chair alarms are AL

## 2017-07-30 NOTE — PLAN OF CARE
Problem: Patient Centered Care  Goal: Patient preferences are identified and integrated in the patient's plan of care  Interventions:  - What would you like us to know as we care for you?  Involve family with plan of care  - Provide timely, complete, and ac

## 2017-07-31 LAB
ANION GAP SERPL CALC-SCNC: 5 MMOL/L (ref 0–18)
BASOPHILS # BLD: 0.1 K/UL (ref 0–0.2)
BASOPHILS NFR BLD: 1 %
BUN SERPL-MCNC: 17 MG/DL (ref 8–20)
BUN/CREAT SERPL: 26.2 (ref 10–20)
CALCIUM SERPL-MCNC: 7.9 MG/DL (ref 8.5–10.5)
CHLORIDE SERPL-SCNC: 103 MMOL/L (ref 95–110)
CO2 SERPL-SCNC: 28 MMOL/L (ref 22–32)
CREAT SERPL-MCNC: 0.65 MG/DL (ref 0.5–1.5)
EOSINOPHIL # BLD: 0.3 K/UL (ref 0–0.7)
EOSINOPHIL NFR BLD: 4 %
ERYTHROCYTE [DISTWIDTH] IN BLOOD BY AUTOMATED COUNT: 14.5 % (ref 11–15)
GLUCOSE SERPL-MCNC: 102 MG/DL (ref 70–99)
HCT VFR BLD AUTO: 34.7 % (ref 35–48)
HGB BLD-MCNC: 11.3 G/DL (ref 12–16)
LYMPHOCYTES # BLD: 0.8 K/UL (ref 1–4)
LYMPHOCYTES NFR BLD: 11 %
MCH RBC QN AUTO: 27.5 PG (ref 27–32)
MCHC RBC AUTO-ENTMCNC: 32.7 G/DL (ref 32–37)
MCV RBC AUTO: 84.1 FL (ref 80–100)
MONOCYTES # BLD: 0.6 K/UL (ref 0–1)
MONOCYTES NFR BLD: 8 %
NEUTROPHILS # BLD AUTO: 5.5 K/UL (ref 1.8–7.7)
NEUTROPHILS NFR BLD: 75 %
OSMOLALITY UR CALC.SUM OF ELEC: 284 MOSM/KG (ref 275–295)
PLATELET # BLD AUTO: 237 K/UL (ref 140–400)
PMV BLD AUTO: 8.5 FL (ref 7.4–10.3)
POTASSIUM SERPL-SCNC: 3.6 MMOL/L (ref 3.3–5.1)
POTASSIUM SERPL-SCNC: 3.6 MMOL/L (ref 3.3–5.1)
POTASSIUM SERPL-SCNC: 4.6 MMOL/L (ref 3.3–5.1)
RBC # BLD AUTO: 4.13 M/UL (ref 3.7–5.4)
SODIUM SERPL-SCNC: 136 MMOL/L (ref 136–144)
WBC # BLD AUTO: 7.3 K/UL (ref 4–11)

## 2017-07-31 PROCEDURE — 99226 SUBSEQUENT OBSERVATION CARE: CPT | Performed by: HOSPITALIST

## 2017-07-31 RX ORDER — POTASSIUM CHLORIDE 20 MEQ/1
40 TABLET, EXTENDED RELEASE ORAL EVERY 4 HOURS
Status: COMPLETED | OUTPATIENT
Start: 2017-07-31 | End: 2017-07-31

## 2017-07-31 NOTE — PROGRESS NOTES
[facility]    Progress Note    [place] Patient Status:  Observation    1924 MRN [id number]   [facility] 5SW/SE Attending Alia Perkins MD   Hosp Day # 1 PCP Manuel Ramos       Subjective:   [place] branches. More distal assessment is limited, predominantly secondary to respiratory motion artifact. 2. Cardiomegaly. Small pericardial effusion. Small left and trace right pleural effusions. Diffuse interlobular septal thickening.  Findings suggest cardiac

## 2017-07-31 NOTE — OCCUPATIONAL THERAPY NOTE
OCCUPATIONAL THERAPY TREATMENT NOTE - INPATIENT     Room Number: 548/548-A         Presenting Problem:  (right pubic rami fracture)    Problem List  Principal Problem:    Closed nondisplaced fracture of pelvis, unspecified part of pelvis, initial encounter Tolerated       PAIN ASSESSMENT  Rating:  (pt not rated )  Location:  (right groin)  Management Techniques: Relaxation;Repositioning     ACTIVITY TOLERANCE  Room air     ACTIVITIES OF DAILY LIVING ASSESSMENT  AM-PAC ‘6-Clicks’ Inpatient Daily Activity Cathie Keep

## 2017-07-31 NOTE — PHYSICAL THERAPY NOTE
PHYSICAL THERAPY TREATMENT NOTE - INPATIENT    Room Number: 548/548-A     Session:3       Presenting Problem: Minimally displaced right inferior pubic rami fx    Problem List  Principal Problem:    Closed nondisplaced fracture of pelvis, unspecified part and standing up from a chair with arms (e.g., wheelchair, bedside commode, etc.): A Lot   -   Moving from lying on back to sitting on the side of the bed?: A Little   How much help from another person does the patient currently need. ..   -   Moving to and to maximize functional independence and safety with mobilities prior to going home    DISCHARGE RECOMMENDATIONS  PT Discharge Recommendations: Sub-acute rehabilitation     PLAN  PT Treatment Plan: Bed mobility; Endurance; Patient education; Energy conservatio

## 2017-07-31 NOTE — PLAN OF CARE
Problem: Patient Centered Care  Goal: Patient preferences are identified and integrated in the patient's plan of care  Interventions:  - What would you like us to know as we care for you?  Involve family with plan of care  - Provide timely, complete, and ac management  - Manage/alleviate anxiety  - Utilize distraction and/or relaxation techniques  - Monitor for opioid side effects  - Notify MD/LIP if interventions unsuccessful or patient reports new pain   Outcome: Progressing      Problem: Patient/Family [facility] with strengthening/mobility  - Encourage toileting schedule   Outcome: Progressing  Frequent rounding, call light within reach, bed in lowest position, prompt assistance with pt's needs

## 2017-07-31 NOTE — PLAN OF CARE
Problem: Patient Centered Care  Goal: Patient preferences are identified and integrated in the patient's plan of care  Interventions:  - What would you like us to know as we care for you?  Involve family with plan of care  - Provide timely, complete, and ac anxiety  - Utilize distraction and/or relaxation techniques  - Monitor for opioid side effects  - Notify MD/LIP if interventions unsuccessful or patient reports new pain   Outcome: Progressing      Problem: Patient/Family Goals  Goal: Patient/Family Long T on right upper thigh. Given PRN tylenol and warm pack. Discussed low potassium level and potassium supplement. Pt verbalized understanding. 2 assist during transfer from chair to bed. Discussed medications with pt. Pt verbalized understanding.  meds crushed

## 2017-07-31 NOTE — PLAN OF CARE
Problem: Patient Centered Care  Goal: Patient preferences are identified and integrated in the patient's plan of care  Interventions:  - What would you like us to know as we care for you?  Involve family with plan of care  - Provide timely, complete, and ac anxiety  - Utilize distraction and/or relaxation techniques  - Monitor for opioid side effects  - Notify MD/LIP if interventions unsuccessful or patient reports new pain   Outcome: Progressing  Pain managed with Tylenol.      Problem: Patient/Family Goals in the lowest position; the safety socks are on; the call light is within reach; and the bed and chair alarms are ALWAYS activated.

## 2017-08-01 VITALS
WEIGHT: 130 LBS | HEART RATE: 68 BPM | BODY MASS INDEX: 22.2 KG/M2 | RESPIRATION RATE: 16 BRPM | DIASTOLIC BLOOD PRESSURE: 51 MMHG | SYSTOLIC BLOOD PRESSURE: 97 MMHG | OXYGEN SATURATION: 96 % | TEMPERATURE: 98 F | HEIGHT: 64 IN

## 2017-08-01 PROCEDURE — 99217 OBSERVATION CARE DISCHARGE: CPT | Performed by: HOSPITALIST

## 2017-08-01 RX ORDER — TRAMADOL HYDROCHLORIDE 50 MG/1
50 TABLET ORAL EVERY 6 HOURS PRN
Qty: 30 TABLET | Refills: 0 | Status: ON HOLD | OUTPATIENT
Start: 2017-08-01 | End: 2018-03-01

## 2017-08-01 RX ORDER — LIDOCAINE 50 MG/G
1 PATCH TOPICAL EVERY 24 HOURS
Qty: 12 PATCH | Refills: 0 | Status: ON HOLD | OUTPATIENT
Start: 2017-08-01 | End: 2018-03-01

## 2017-08-01 NOTE — DISCHARGE PLANNING
3:40pm Update- [facility] liaison has spoken with the dtr and provided the financial information. Awaiting confirmation to prepare transfer arrangements. --  3:24pm Update- [facility] has clinically accepted.  They have left a message to the dtr Pa that [facility] admissions will also call her. Dtr will inform the SW supervisor of the dtr's request to speak with upper management.     fatuma rodriguez,RENETTA [id number]

## 2017-08-01 NOTE — PLAN OF CARE
Problem: Patient Centered Care  Goal: Patient preferences are identified and integrated in the patient's plan of care  Interventions:  - What would you like us to know as we care for you?  Involve family with plan of care  - Provide timely, complete, and ac anxiety  - Utilize distraction and/or relaxation techniques  - Monitor for opioid side effects  - Notify MD/LIP if interventions unsuccessful or patient reports new pain   Outcome: Progressing      Problem: Patient/Family Goals  Goal: Patient/Family Long T oriented x4 but forgetful and impulsive. Bed alarm on and activated. Call light within reach. Frequent rounding on patient.

## 2017-08-01 NOTE — PLAN OF CARE
Family at bedside. Daughter states \"I cannot take care of her (patient) at home. She cannot walk. \" discharge order in place.  notified. MD aware. Ed case manager Arnel Harding called. Awaiting further orders.  Pt and daughter informed if pt stays in the h

## 2017-08-01 NOTE — PHYSICAL THERAPY NOTE
PHYSICAL THERAPY TREATMENT NOTE - INPATIENT    Room Number: 548/548-A       Presenting Problem: Minimally displaced right inferior pubic rami fx    Problem List  Principal Problem:    Closed nondisplaced fracture of pelvis, unspecified part of pelvis, ini difficulty does the patient currently have. ..  -   Turning over in bed (including adjusting bedclothes, sheets and blankets)?: A Little   -   Sitting down on and standing up from a chair with arms (e.g., wheelchair, bedside commode, etc.): A Lot   -   Movi assistance level: supervision with walker - rolling   Goal #2  Current Status     Goal #3 Patient is able to ambulate 10 feet with assist device: walker - rolling at assistance level: supervision   Goal #3   Current Status     Goal #4     Goal #4   Current

## 2017-08-01 NOTE — DISCHARGE SUMMARY
[facility]    Discharge Summary    [place] Patient Status:  Observation    1924 MRN [id number]   [facility] 5SW/SE Attending Anjali Fritz MD   Hosp Day # 1 PCP Lynnea Babinski     Date of Admission:  work on consult   Pain control with tylenol and lidoderm patch  Hasn't been getting the tramadol     Paroxysmal A. fib  Not on anticoagulation secondary to fall risk, rate controlled at this time will continue metoprolol.     Benign hypertension  Continue Tabs  Commonly known as:  LASIX      Take 1 tablet (40 mg total) by mouth daily. Quantity:  30 tablet  Refills:  3     Memantine HCl 10 MG Tabs  Commonly known as:  NAMENDA      Take 1 tablet (10 mg total) by mouth 2 (two) times daily.    Quantity:  180 t

## 2017-08-02 NOTE — PLAN OF CARE
RN received phone call from patients daughter Heraclio Guevara who is upset with the situation. Daughter arrived to  patient Discharge paperwork and prescriptions explained to patient and family. All questions were answered.  Patient assisted from room to ca

## 2017-08-02 NOTE — CM/SW NOTE
Received call from  for patient in room 548, pt has discharge order and daughter-Irina is POA. Vikram Weinstein was not in patient;s room, phone call made and stated she has no money to pay [address] [place] [address] Drive for respite care.  Informed as instructed by Adelita/

## 2017-09-19 ENCOUNTER — TELEPHONE (OUTPATIENT)
Dept: CARDIOLOGY CLINIC | Facility: CLINIC | Age: 82
End: 2017-09-19

## 2017-09-19 NOTE — TELEPHONE ENCOUNTER
Sent call to JUSTYN - Arash Me states pt is experiencing Swelling in legs and hardening in feet. Pls call. Thank you.

## 2017-09-19 NOTE — TELEPHONE ENCOUNTER
Pt is being discharged from [facility] today. Pt had a fall. Per Aneudy Pérez, daughter, she has noticed that Mrs. Ileana Curry bilateral ankles and foot are swollen and hard. Pt is not short of breath or is having any chest discomfort.  Pt has been moving around wi

## 2017-09-22 ENCOUNTER — APPOINTMENT (OUTPATIENT)
Dept: LAB | Age: 82
End: 2017-09-22
Attending: NURSE PRACTITIONER
Payer: MEDICARE

## 2017-09-22 ENCOUNTER — OFFICE VISIT (OUTPATIENT)
Dept: CARDIOLOGY CLINIC | Facility: CLINIC | Age: 82
End: 2017-09-22

## 2017-09-22 VITALS
WEIGHT: 119 LBS | SYSTOLIC BLOOD PRESSURE: 100 MMHG | BODY MASS INDEX: 20 KG/M2 | DIASTOLIC BLOOD PRESSURE: 60 MMHG | HEART RATE: 96 BPM

## 2017-09-22 DIAGNOSIS — R60.9 EDEMA, UNSPECIFIED TYPE: Primary | ICD-10-CM

## 2017-09-22 DIAGNOSIS — R06.00 DYSPNEA, UNSPECIFIED TYPE: ICD-10-CM

## 2017-09-22 DIAGNOSIS — R60.9 EDEMA, UNSPECIFIED TYPE: ICD-10-CM

## 2017-09-22 PROCEDURE — 99214 OFFICE O/P EST MOD 30 MIN: CPT | Performed by: NURSE PRACTITIONER

## 2017-09-22 PROCEDURE — G0463 HOSPITAL OUTPT CLINIC VISIT: HCPCS | Performed by: NURSE PRACTITIONER

## 2017-09-22 PROCEDURE — 83880 ASSAY OF NATRIURETIC PEPTIDE: CPT

## 2017-09-22 PROCEDURE — 80048 BASIC METABOLIC PNL TOTAL CA: CPT

## 2017-09-22 PROCEDURE — 36415 COLL VENOUS BLD VENIPUNCTURE: CPT

## 2017-09-22 RX ORDER — FUROSEMIDE 20 MG/1
1 TABLET ORAL DAILY
COMMUNITY
Start: 2017-09-19 | End: 2017-10-31

## 2017-09-22 NOTE — PROGRESS NOTES
Becky Freeman is a 80year old female. Patient presents with:  Hospital F/U    HPI:   Patient is here today for a checkup for some lower extremity edema.   She has a history of diastolic dysfunction atrial fibrillation she had a fall in July and ended up Years: 0.00         Quit date: 1/1/1954  Smokeless tobacco: Never Used                      Alcohol use: Yes           0.0 oz/week     Comment: glass of wine/beer1 time per week       REVIEW OF SYSTEMS:   GENERAL HEALTH: feels well otherwise  SKIN: denies

## 2017-09-22 NOTE — PATIENT INSTRUCTIONS
1. Continue metoprolol and hold only if BP is less than 467 systolic  2. Increase furosemide to 40mg  Daily for 1 wk  3.  Blood test today

## 2017-09-26 ENCOUNTER — TELEPHONE (OUTPATIENT)
Dept: CARDIOLOGY CLINIC | Facility: CLINIC | Age: 82
End: 2017-09-26

## 2017-09-26 DIAGNOSIS — R60.9 EDEMA, UNSPECIFIED TYPE: Primary | ICD-10-CM

## 2017-09-26 RX ORDER — POTASSIUM CHLORIDE 20 MEQ/1
20 TABLET, EXTENDED RELEASE ORAL DAILY
Qty: 30 TABLET | Refills: 0 | Status: SHIPPED | OUTPATIENT
Start: 2017-09-26 | End: 2017-10-31

## 2017-09-26 NOTE — TELEPHONE ENCOUNTER
S/w son and is agreeable to recommendations. RX sent to pharmacy and labs placed in [facility].

## 2017-09-26 NOTE — TELEPHONE ENCOUNTER
----- Message from MEGHAN Jones sent at 9/26/2017  8:25 AM CDT -----  BNP mildly elevated, BMP stable but with increase lasix will need K supplement.  Start kdur 20meq and recheck in 1 month

## 2017-09-28 ENCOUNTER — TELEPHONE (OUTPATIENT)
Dept: CARDIOLOGY CLINIC | Facility: CLINIC | Age: 82
End: 2017-09-28

## 2017-09-28 NOTE — TELEPHONE ENCOUNTER
Cheryl Garcia requesting to see if [facility] reviewed lab results. Pls call. Thank you.

## 2017-10-27 ENCOUNTER — TELEPHONE (OUTPATIENT)
Dept: CARDIOLOGY CLINIC | Facility: CLINIC | Age: 82
End: 2017-10-27

## 2017-10-27 NOTE — TELEPHONE ENCOUNTER
Pts daughter requesting a call back regarding dosage for Rx Potassium Chloride. Please call thank you         Current Outpatient Prescriptions:   •  Potassium Chloride ER 20 MEQ Oral Tab CR, Take 1 tablet (20 mEq total) by mouth daily. , Disp: 30 tablet, Rf

## 2017-10-28 ENCOUNTER — APPOINTMENT (OUTPATIENT)
Dept: LAB | Age: 82
End: 2017-10-28
Attending: INTERNAL MEDICINE
Payer: MEDICARE

## 2017-10-28 DIAGNOSIS — R60.9 EDEMA, UNSPECIFIED TYPE: ICD-10-CM

## 2017-10-28 PROCEDURE — 80048 BASIC METABOLIC PNL TOTAL CA: CPT

## 2017-10-28 PROCEDURE — 36415 COLL VENOUS BLD VENIPUNCTURE: CPT

## 2017-10-31 ENCOUNTER — TELEPHONE (OUTPATIENT)
Dept: CARDIOLOGY CLINIC | Facility: CLINIC | Age: 82
End: 2017-10-31

## 2017-10-31 RX ORDER — POTASSIUM CHLORIDE 20 MEQ/1
20 TABLET, EXTENDED RELEASE ORAL DAILY
Qty: 30 TABLET | Refills: 2 | Status: SHIPPED | OUTPATIENT
Start: 2017-10-31 | End: 2017-12-04

## 2017-10-31 RX ORDER — FUROSEMIDE 20 MG/1
20 TABLET ORAL DAILY
Qty: 30 TABLET | Refills: 2 | Status: SHIPPED | OUTPATIENT
Start: 2017-10-31 | End: 2018-03-09

## 2017-12-04 ENCOUNTER — TELEPHONE (OUTPATIENT)
Dept: CARDIOLOGY CLINIC | Facility: CLINIC | Age: 82
End: 2017-12-04

## 2017-12-04 RX ORDER — POTASSIUM CHLORIDE 20 MEQ/1
20 TABLET, EXTENDED RELEASE ORAL DAILY
Qty: 30 TABLET | Refills: 2 | Status: SHIPPED | OUTPATIENT
Start: 2017-12-04

## 2017-12-04 NOTE — TELEPHONE ENCOUNTER
Potassium CL 20MEQ ER tabs, take 1 tab (20MEQ) by mouth daily, qty 90---requesting 90 day supply    Current Outpatient Prescriptions:   •  Potassium Chloride ER 20 MEQ Oral Tab CR, Take 1 tablet (20 mEq total) by mouth daily. , Disp: 30 tablet, Rfl: 2

## 2017-12-05 NOTE — TELEPHONE ENCOUNTER
Received fax from [facility] stating \"Drug not covered by patient plan. The preferred alternative is KLOR-CON M20 ER TABLETS. Please call/fax the pharmacy to change medication along with strength, directions, quality and refills\".      KLOR-CON M20 ER T

## 2017-12-05 NOTE — TELEPHONE ENCOUNTER
Spoke with pharmacist.  States that generic potassium ws dispensed to pt and covered.   Please disregard this request.

## 2018-01-18 RX ORDER — MEMANTINE HYDROCHLORIDE 10 MG/1
TABLET ORAL
Qty: 180 TABLET | Refills: 0 | OUTPATIENT
Start: 2018-01-18

## 2018-02-19 ENCOUNTER — TELEPHONE (OUTPATIENT)
Dept: CARDIOLOGY CLINIC | Facility: CLINIC | Age: 83
End: 2018-02-19

## 2018-02-28 ENCOUNTER — HOSPITAL ENCOUNTER (INPATIENT)
Facility: HOSPITAL | Age: 83
LOS: 8 days | Discharge: NURSING, REHAB OR LONG-TERM CARE | DRG: 291 | End: 2018-03-09
Attending: EMERGENCY MEDICINE | Admitting: HOSPITALIST
Payer: MEDICARE

## 2018-02-28 DIAGNOSIS — I50.33 ACUTE ON CHRONIC DIASTOLIC CONGESTIVE HEART FAILURE (HCC): Primary | ICD-10-CM

## 2018-03-01 ENCOUNTER — APPOINTMENT (OUTPATIENT)
Dept: CV DIAGNOSTICS | Facility: HOSPITAL | Age: 83
DRG: 291 | End: 2018-03-01
Attending: INTERNAL MEDICINE
Payer: MEDICARE

## 2018-03-01 ENCOUNTER — APPOINTMENT (OUTPATIENT)
Dept: GENERAL RADIOLOGY | Facility: HOSPITAL | Age: 83
DRG: 291 | End: 2018-03-01
Attending: EMERGENCY MEDICINE
Payer: MEDICARE

## 2018-03-01 PROBLEM — I50.33 ACUTE ON CHRONIC DIASTOLIC CONGESTIVE HEART FAILURE (HCC): Status: ACTIVE | Noted: 2018-03-01

## 2018-03-01 LAB
ANION GAP SERPL CALC-SCNC: 10 MMOL/L (ref 0–18)
ANION GAP SERPL CALC-SCNC: 7 MMOL/L (ref 0–18)
ANION GAP SERPL CALC-SCNC: 7 MMOL/L (ref 0–18)
BASOPHILS # BLD: 0.1 K/UL (ref 0–0.2)
BASOPHILS # BLD: 0.1 K/UL (ref 0–0.2)
BASOPHILS NFR BLD: 1 %
BASOPHILS NFR BLD: 1 %
BNP SERPL-MCNC: 560 PG/ML (ref 0–100)
BUN SERPL-MCNC: 29 MG/DL (ref 8–20)
BUN SERPL-MCNC: 30 MG/DL (ref 8–20)
BUN SERPL-MCNC: 35 MG/DL (ref 8–20)
BUN/CREAT SERPL: 14.6 (ref 10–20)
BUN/CREAT SERPL: 14.9 (ref 10–20)
BUN/CREAT SERPL: 18 (ref 10–20)
CALCIUM SERPL-MCNC: 7.3 MG/DL (ref 8.5–10.5)
CALCIUM SERPL-MCNC: 7.6 MG/DL (ref 8.5–10.5)
CALCIUM SERPL-MCNC: 7.7 MG/DL (ref 8.5–10.5)
CHLORIDE SERPL-SCNC: 102 MMOL/L (ref 95–110)
CHLORIDE SERPL-SCNC: 103 MMOL/L (ref 95–110)
CHLORIDE SERPL-SCNC: 103 MMOL/L (ref 95–110)
CO2 SERPL-SCNC: 24 MMOL/L (ref 22–32)
CO2 SERPL-SCNC: 24 MMOL/L (ref 22–32)
CO2 SERPL-SCNC: 26 MMOL/L (ref 22–32)
CREAT SERPL-MCNC: 1.94 MG/DL (ref 0.5–1.5)
CREAT SERPL-MCNC: 1.98 MG/DL (ref 0.5–1.5)
CREAT SERPL-MCNC: 2.02 MG/DL (ref 0.5–1.5)
EOSINOPHIL # BLD: 0.1 K/UL (ref 0–0.7)
EOSINOPHIL # BLD: 0.2 K/UL (ref 0–0.7)
EOSINOPHIL NFR BLD: 2 %
EOSINOPHIL NFR BLD: 2 %
ERYTHROCYTE [DISTWIDTH] IN BLOOD BY AUTOMATED COUNT: 16.9 % (ref 11–15)
ERYTHROCYTE [DISTWIDTH] IN BLOOD BY AUTOMATED COUNT: 17 % (ref 11–15)
GLUCOSE SERPL-MCNC: 92 MG/DL (ref 70–99)
GLUCOSE SERPL-MCNC: 95 MG/DL (ref 70–99)
GLUCOSE SERPL-MCNC: 97 MG/DL (ref 70–99)
HCT VFR BLD AUTO: 32.3 % (ref 35–48)
HCT VFR BLD AUTO: 33.8 % (ref 35–48)
HGB BLD-MCNC: 10.5 G/DL (ref 12–16)
HGB BLD-MCNC: 10.6 G/DL (ref 12–16)
LYMPHOCYTES # BLD: 0.5 K/UL (ref 1–4)
LYMPHOCYTES # BLD: 0.7 K/UL (ref 1–4)
LYMPHOCYTES NFR BLD: 6 %
LYMPHOCYTES NFR BLD: 8 %
MCH RBC QN AUTO: 26.7 PG (ref 27–32)
MCH RBC QN AUTO: 27.3 PG (ref 27–32)
MCHC RBC AUTO-ENTMCNC: 31.4 G/DL (ref 32–37)
MCHC RBC AUTO-ENTMCNC: 32.5 G/DL (ref 32–37)
MCV RBC AUTO: 84.1 FL (ref 80–100)
MCV RBC AUTO: 85.1 FL (ref 80–100)
MONOCYTES # BLD: 0.4 K/UL (ref 0–1)
MONOCYTES # BLD: 0.5 K/UL (ref 0–1)
MONOCYTES NFR BLD: 5 %
MONOCYTES NFR BLD: 6 %
NEUTROPHILS # BLD AUTO: 6.8 K/UL (ref 1.8–7.7)
NEUTROPHILS # BLD AUTO: 7.5 K/UL (ref 1.8–7.7)
NEUTROPHILS NFR BLD: 83 %
NEUTROPHILS NFR BLD: 87 %
OSMOLALITY UR CALC.SUM OF ELEC: 284 MOSM/KG (ref 275–295)
OSMOLALITY UR CALC.SUM OF ELEC: 288 MOSM/KG (ref 275–295)
OSMOLALITY UR CALC.SUM OF ELEC: 289 MOSM/KG (ref 275–295)
PLATELET # BLD AUTO: 291 K/UL (ref 140–400)
PLATELET # BLD AUTO: 325 K/UL (ref 140–400)
PMV BLD AUTO: 7.9 FL (ref 7.4–10.3)
PMV BLD AUTO: 8.1 FL (ref 7.4–10.3)
POTASSIUM SERPL-SCNC: 3.1 MMOL/L (ref 3.3–5.1)
POTASSIUM SERPL-SCNC: 3.1 MMOL/L (ref 3.3–5.1)
POTASSIUM SERPL-SCNC: 4 MMOL/L (ref 3.3–5.1)
POTASSIUM SERPL-SCNC: 4.8 MMOL/L (ref 3.3–5.1)
RBC # BLD AUTO: 3.84 M/UL (ref 3.7–5.4)
RBC # BLD AUTO: 3.97 M/UL (ref 3.7–5.4)
SODIUM SERPL-SCNC: 133 MMOL/L (ref 136–144)
SODIUM SERPL-SCNC: 136 MMOL/L (ref 136–144)
SODIUM SERPL-SCNC: 137 MMOL/L (ref 136–144)
TROPONIN I SERPL-MCNC: 0.03 NG/ML (ref ?–0.03)
WBC # BLD AUTO: 8.2 K/UL (ref 4–11)
WBC # BLD AUTO: 8.7 K/UL (ref 4–11)

## 2018-03-01 PROCEDURE — B246ZZZ ULTRASONOGRAPHY OF RIGHT AND LEFT HEART: ICD-10-PCS | Performed by: INTERNAL MEDICINE

## 2018-03-01 PROCEDURE — 71045 X-RAY EXAM CHEST 1 VIEW: CPT | Performed by: EMERGENCY MEDICINE

## 2018-03-01 PROCEDURE — 93306 TTE W/DOPPLER COMPLETE: CPT | Performed by: INTERNAL MEDICINE

## 2018-03-01 RX ORDER — FUROSEMIDE 10 MG/ML
40 INJECTION INTRAMUSCULAR; INTRAVENOUS
Status: DISCONTINUED | OUTPATIENT
Start: 2018-03-01 | End: 2018-03-02

## 2018-03-01 RX ORDER — POTASSIUM CHLORIDE 20 MEQ/1
20 TABLET, EXTENDED RELEASE ORAL DAILY
Status: DISCONTINUED | OUTPATIENT
Start: 2018-03-01 | End: 2018-03-02

## 2018-03-01 RX ORDER — ACETAMINOPHEN 325 MG/1
650 TABLET ORAL EVERY 6 HOURS PRN
Status: DISCONTINUED | OUTPATIENT
Start: 2018-03-01 | End: 2018-03-09

## 2018-03-01 RX ORDER — ONDANSETRON 2 MG/ML
4 INJECTION INTRAMUSCULAR; INTRAVENOUS EVERY 6 HOURS PRN
Status: DISCONTINUED | OUTPATIENT
Start: 2018-03-01 | End: 2018-03-09

## 2018-03-01 RX ORDER — FUROSEMIDE 10 MG/ML
40 INJECTION INTRAMUSCULAR; INTRAVENOUS 3 TIMES DAILY
Status: DISCONTINUED | OUTPATIENT
Start: 2018-03-01 | End: 2018-03-01

## 2018-03-01 RX ORDER — 0.9 % SODIUM CHLORIDE 0.9 %
VIAL (ML) INJECTION
Status: COMPLETED
Start: 2018-03-01 | End: 2018-03-01

## 2018-03-01 RX ORDER — HEPARIN SODIUM 5000 [USP'U]/ML
5000 INJECTION, SOLUTION INTRAVENOUS; SUBCUTANEOUS EVERY 12 HOURS SCHEDULED
Status: DISCONTINUED | OUTPATIENT
Start: 2018-03-01 | End: 2018-03-09

## 2018-03-01 RX ORDER — MEMANTINE HYDROCHLORIDE 10 MG/1
10 TABLET ORAL 2 TIMES DAILY
Status: DISCONTINUED | OUTPATIENT
Start: 2018-03-01 | End: 2018-03-09

## 2018-03-01 RX ORDER — FUROSEMIDE 10 MG/ML
40 INJECTION INTRAMUSCULAR; INTRAVENOUS ONCE
Status: COMPLETED | OUTPATIENT
Start: 2018-03-01 | End: 2018-03-01

## 2018-03-01 RX ORDER — POTASSIUM CHLORIDE 20 MEQ/1
40 TABLET, EXTENDED RELEASE ORAL EVERY 4 HOURS
Status: COMPLETED | OUTPATIENT
Start: 2018-03-01 | End: 2018-03-01

## 2018-03-01 RX ORDER — TRAMADOL HYDROCHLORIDE 50 MG/1
50 TABLET ORAL EVERY 12 HOURS PRN
Status: DISCONTINUED | OUTPATIENT
Start: 2018-03-01 | End: 2018-03-09

## 2018-03-01 RX ORDER — HALOPERIDOL 5 MG/ML
2 INJECTION INTRAMUSCULAR EVERY 6 HOURS PRN
Status: DISCONTINUED | OUTPATIENT
Start: 2018-03-01 | End: 2018-03-09

## 2018-03-01 NOTE — PHYSICAL THERAPY NOTE
PHYSICAL THERAPY EVALUATION - INPATIENT     Room Number: 206/385-H  Evaluation Date: 3/1/2018  Type of Evaluation: Initial   Physician Order: PT Eval and Treat    Presenting Problem:  (Acute on CHF,Weakness,H/O Dementia,falls,early Parkison's)  Reason for are below the patient's pre-admission status. Patient will benefit from continued IP PT services to address these deficits in preparation for discharge.   Recommend Home PT upon DC from hospital to maximize functional potential and achieve prior level of SURGERY  No date: TONSILLECTOMY    HOME SITUATION  Type of Home: House   Home Layout: One level  Stairs to Enter : 5  Railing: Yes          Lives With:  (Lives with daughter,JOSE LUIS and has a part time caregiver)  Drives: No  Patient Owned Equipment: Klever Allen AM-PAC Score:  Raw Score: 18   PT Approx Degree of Impairment Score: 46.58%   Standardized Score (AM-PAC Scale): 43.63   CMS Modifier (G-Code): CK    FUNCTIONAL ABILITY STATUS  Gait Assessment   Gait Assistance:  (CGAx1)  Distance (ft): 85ft,15ft  Assi

## 2018-03-01 NOTE — H&P
[address] [facility] Patient Status:  Emergency    1924 MRN [id number]   Location [address] Attending Kaylene Hall MD   Hosp Day # 0 PCP Omar [place]     Date:  3/1/20 (NAMENDA) 10 MG Oral Tab   No No   Sig: Take 1 tablet (10 mg total) by mouth 2 (two) times daily. Potassium Chloride ER 20 MEQ Oral Tab CR   No No   Sig: Take 1 tablet (20 mEq total) by mouth daily.    TraMADol HCl 50 MG Oral Tab   No No   Sig: Take 1 tab 02/28/2018   HGB 10.5 02/28/2018   HCT 32.3 02/28/2018    02/28/2018   CREATSERUM 1.94 02/28/2018   BUN 35 02/28/2018    02/28/2018   K 3.1 02/28/2018    02/28/2018   CO2 24 02/28/2018   GLU 97 02/28/2018   CA 7.3 02/28/2018   TROP 0.03

## 2018-03-01 NOTE — HISTORICAL OFFICE NOTE
Tl Carter  : 1924  ACCOUNT:  [id number]  [phone]  PCP: Dr. Liberty Gudino     TODAY'S DATE: 02/10/2017  DICTATED BY:  Andry Teague M.D.]    CHIEF COMPLAINT: [Hospital D/C.]    HPI:  [On 02/10/2017, [place], a 80year old female, pres reviewed and discussed. HEAD/FACE: no trauma and normocephalic. EYES: conjunctivae not injected and no xanthelasma. ENT: mucosa pink and moist. NECK: jugular venous pressure not elevated.  RESP: respirations with normal rate and rhythm, clear to auscultatio 02/10/17 Potassium Chloride [id number]     1 daily                                    Kei Luciano M.D., F.A.C.C.   Kenya/nury-Job ID: [id number]  D: 02/10/2017   T: 02/18/2017  C: Dr. Katalina Beth

## 2018-03-01 NOTE — PROGRESS NOTES
Pt seen and examined. See H and P from Dr Camacho Parents.   Creat stable this am. Repeat in am. If worsens will consult nephrology.      Heriberto De Los Santos

## 2018-03-01 NOTE — ED NOTES
Pt's daughter states h/o CHF. Past week having increased edema in legs and abdomen, SOB with exertion. Pt h/o dementia. PT lives with Daughter.  Denies fevers, cough, n/v/d.

## 2018-03-01 NOTE — PROGRESS NOTES
[facility] Pharmacy Note:  Renal Dose Adjustment for Tramadol Dennis Ragland    [address] has been prescribed Tramadol (ULTRAM) 50 mg orally every 6 hours as needed for pain. Estimated Creatinine Clearance: 15 mL/min (based on SCr of 1.94 mg/dL (H)).     Her c

## 2018-03-01 NOTE — CONSULTS
[facility]    Report of Consultation    [address] Patient Status:  Inpatient    1924 MRN [id number]   Location [facility] 3W/SW Attending Fidel Middleton MD   Hosp Day # 0 PCP Bashir Galindo     Date of Admission:   APPENDECTOMY  4/14/17 : CHOLECYSTECTOMY  No date: COLECTOMY  1988: COLON SURGERY  No date: TONSILLECTOMY  History reviewed. No pertinent family history.   Social History:  Smoking status: Former Smoker (L) 03/01/2018   HCT 33.8 (L) 03/01/2018    03/01/2018   CREATSERUM 1.98 (H) 03/01/2018   BUN 29 (H) 03/01/2018    03/01/2018   K 3.1 (L) 03/01/2018    03/01/2018   CO2 24 03/01/2018   GLU 92 03/01/2018   CA 7.7 (L) 03/01/2018   ALB 2.2

## 2018-03-01 NOTE — ED PROVIDER NOTES
Patient Seen in: [facility] AND [facility] Emergency Department    History   Patient presents with:  Congestive Heart Failure (cardiovascular)    Stated Complaint: fluid overload in legs    HPI    80year-old female with history of congestive heart failure with °F (36.9 °C)  Temp src: Oral  SpO2: 94 %  O2 Device: None (Room air)    Current:/51   Pulse 86   Temp 98.5 °F (36.9 °C) (Oral)   Resp 23   Ht 160 cm (5' 3\")   Wt 54 kg   SpO2 95%   BMI 21.09 kg/m²         Physical Exam    General Appearance: alert, CBC W/ DIFFERENTIAL[[id number]]          Abnormal            Final result                 Please view results for these tests on the individual orders.    BASIC METABOLIC PANEL (8)   RAINBOW DRAW BLUE   RAINBOW DRAW LAVENDER   RAINBOW DRAW DARK GREEN

## 2018-03-02 LAB
ALBUMIN SERPL BCP-MCNC: 1.9 G/DL (ref 3.5–4.8)
ANION GAP SERPL CALC-SCNC: 5 MMOL/L (ref 0–18)
BASOPHILS # BLD: 0.1 K/UL (ref 0–0.2)
BASOPHILS NFR BLD: 1 %
BUN SERPL-MCNC: 31 MG/DL (ref 8–20)
BUN/CREAT SERPL: 14.5 (ref 10–20)
CALCIUM SERPL-MCNC: 7.9 MG/DL (ref 8.5–10.5)
CHLORIDE SERPL-SCNC: 103 MMOL/L (ref 95–110)
CO2 SERPL-SCNC: 30 MMOL/L (ref 22–32)
CREAT SERPL-MCNC: 2.14 MG/DL (ref 0.5–1.5)
EOSINOPHIL # BLD: 0.1 K/UL (ref 0–0.7)
EOSINOPHIL NFR BLD: 1 %
ERYTHROCYTE [DISTWIDTH] IN BLOOD BY AUTOMATED COUNT: 16.7 % (ref 11–15)
GLUCOSE SERPL-MCNC: 87 MG/DL (ref 70–99)
HCT VFR BLD AUTO: 31.5 % (ref 35–48)
HGB BLD-MCNC: 10.2 G/DL (ref 12–16)
LYMPHOCYTES # BLD: 0.5 K/UL (ref 1–4)
LYMPHOCYTES NFR BLD: 7 %
MCH RBC QN AUTO: 27.3 PG (ref 27–32)
MCHC RBC AUTO-ENTMCNC: 32.3 G/DL (ref 32–37)
MCV RBC AUTO: 84.6 FL (ref 80–100)
MONOCYTES # BLD: 0.5 K/UL (ref 0–1)
MONOCYTES NFR BLD: 7 %
NEUTROPHILS # BLD AUTO: 5.8 K/UL (ref 1.8–7.7)
NEUTROPHILS NFR BLD: 83 %
OSMOLALITY UR CALC.SUM OF ELEC: 292 MOSM/KG (ref 275–295)
PLATELET # BLD AUTO: 293 K/UL (ref 140–400)
PMV BLD AUTO: 7.7 FL (ref 7.4–10.3)
POTASSIUM SERPL-SCNC: 4.7 MMOL/L (ref 3.3–5.1)
RBC # BLD AUTO: 3.72 M/UL (ref 3.7–5.4)
SODIUM SERPL-SCNC: 138 MMOL/L (ref 136–144)
WBC # BLD AUTO: 6.9 K/UL (ref 4–11)

## 2018-03-02 PROCEDURE — 99233 SBSQ HOSP IP/OBS HIGH 50: CPT | Performed by: HOSPITALIST

## 2018-03-02 PROCEDURE — 99223 1ST HOSP IP/OBS HIGH 75: CPT | Performed by: INTERNAL MEDICINE

## 2018-03-02 NOTE — CM/SW NOTE
3/2/18 CM Discharge Planning  Spoke with dtr Myriam Lockhart via phone [phone] discharge planning discussed. Pt resides with dtr and family. Dtr aware that pt will need 24 hr care, requested rehab at [facility].    Referral and medical records faxed to [facility]

## 2018-03-02 NOTE — CONSULTS
[facility]    Report of Consultation    Date of Admission:  2/28/2018  Date of Consult:  3/2/2018   Reason for Consultation:     HARESH     History of Present Illness:     Patient is a 14-year-old female with past medical history of chronic (ULTRAM) tab 50 mg 50 mg Oral Q12H PRN   ondansetron HCl (ZOFRAN) injection 4 mg 4 mg Intravenous Q6H PRN   acetaminophen (TYLENOL) tab 650 mg 650 mg Oral Q6H PRN   Heparin Sodium (Porcine) 5000 UNIT/ML injection 5,000 Units 5,000 Units Subcutaneous 2 time 10.2*   HCT  32.3*  33.8*  31.5*   MCV  84.1  85.1  84.6   WBC  8.7  8.2  6.9   PLT  291  325  293     Recent Labs   Lab  03/01/18   0609  03/01/18   1203  03/01/18   1942  03/02/18   0535   GLU  92  95   --   87   BUN  29*  30*   --   31*   CREATSERUM  1

## 2018-03-02 NOTE — PROGRESS NOTES
Pt A&Ox2, confused but pleasant. Has no complaints today. 2+pitting edema in BLE. IV lasix BID. Echo completed today. Vitals stable. Continue to monitor.

## 2018-03-02 NOTE — PLAN OF CARE
Pt sitting in chair. Denies pain and SOB. VSS. Up with assist and a walker. Pt had incontinent episodes. Lower extremity edema still present, on scheduled lasix. Safety measures in place. Will continue to monitor.

## 2018-03-02 NOTE — PROGRESS NOTES
[place] [facility] - [facility]    Progress Note    [place] Patient Status:  Inpatient    1924 MRN [id number]   Location [facility] 3W/SW Attending Barbie Powell, [address] [facility] Day # 1 PCP [place]       Subjective:   [place] 08/03/2015   ROCIO 60 04/14/2017   LIP 27 04/16/2017   MG 2.4 04/15/2017   PHOS 3.4 04/14/2017   TROP 0.03 02/28/2018       Xr Chest Ap Portable  (cpt=71045)    Result Date: 3/1/2018  CONCLUSION:  1. Mild-moderate cardiomegaly.  2. Bilateral mixed alveolar an estimated ejection fraction was 55-60%. Wall motion was normal; there     were no regional wall motion abnormalities. 2. Aortic valve: Mild regurgitation. 3. Mitral valve: Mildly calcified annulus. Mildly thickened leaflets. .     Moderate regurgitation. Spoke with consultant. All questions answered.          9/8/9394     Certification      PHYSICIAN Certification of Need for Inpatient Hospitalization - Initial Certification    Patient will require inpatient services that will reasonably be expected to span

## 2018-03-02 NOTE — PHYSICAL THERAPY NOTE
PHYSICAL THERAPY TREATMENT NOTE - INPATIENT     Room Number: 899/601-T       Presenting Problem:  (Acute on CHF,Weakness,H/O Dementia,falls,early Parkison's)    Problem List  Principal Problem:    Acute on chronic diastolic congestive heart failure ([facility] [address] maximize functional potential and achieve higher level of function. Recommend Home PT upon DC from hospital to maximize functional potential and achieve prior level of function.   Recommend 24 hour supervision/assist upon DC from the hospital to ensure saf CK    FUNCTIONAL ABILITY STATUS  Gait Assessment   Gait Assistance:  (CGAx1)  Distance (ft): 15ft, 30ft  Assistive Device: Rolling walker  Pattern:  (decrease safety, increase leanin on the rw)  Stoop/Curb Assistance: Not tested           [facility]APE[facility]

## 2018-03-02 NOTE — PROGRESS NOTES
[facility]    Cardiology Progress Note    [place] Patient Status:  Inpatient    1924 MRN [id number]   Location [facility] 3W/SW Attending Rolan Parks, [address] [facility] [address] Day # 1 PCP Jono Card     Primary Cardiologist: correct per PCP    Anemia - Decreased hemoglobin PCP    RECS  - renal cs pending  - continue BB  - further recs pending above        Results:     Lab Results  Component Value Date   WBC 6.9 03/02/2018   HGB 10.2 (L) 03/02/2018   HCT 31.5 (L) 03/02/2018   P

## 2018-03-03 ENCOUNTER — APPOINTMENT (OUTPATIENT)
Dept: ULTRASOUND IMAGING | Facility: HOSPITAL | Age: 83
DRG: 291 | End: 2018-03-03
Attending: INTERNAL MEDICINE
Payer: MEDICARE

## 2018-03-03 LAB
ALBUMIN SERPL BCP-MCNC: 1.7 G/DL (ref 3.5–4.8)
ALBUMIN SERPL BCP-MCNC: 1.8 G/DL (ref 3.5–4.8)
ALP SERPL-CCNC: 97 U/L (ref 32–100)
ALT SERPL-CCNC: 10 U/L (ref 14–54)
ANION GAP SERPL CALC-SCNC: 9 MMOL/L (ref 0–18)
AST SERPL-CCNC: 22 U/L (ref 15–41)
BASOPHILS # BLD: 0.1 K/UL (ref 0–0.2)
BASOPHILS NFR BLD: 1 %
BILIRUB DIRECT SERPL-MCNC: 0.3 MG/DL (ref 0–0.2)
BILIRUB SERPL-MCNC: 0.7 MG/DL (ref 0.3–1.2)
BILIRUB UR QL: NEGATIVE
BUN SERPL-MCNC: 36 MG/DL (ref 8–20)
BUN/CREAT SERPL: 16.7 (ref 10–20)
CALCIUM SERPL-MCNC: 7.7 MG/DL (ref 8.5–10.5)
CHLORIDE SERPL-SCNC: 103 MMOL/L (ref 95–110)
CLARITY UR: CLEAR
CO2 SERPL-SCNC: 24 MMOL/L (ref 22–32)
COLOR UR: YELLOW
CREAT SERPL-MCNC: 2.15 MG/DL (ref 0.5–1.5)
CREAT UR-MCNC: 46.5 MG/DL
EOSINOPHIL # BLD: 0.1 K/UL (ref 0–0.7)
EOSINOPHIL NFR BLD: 1 %
ERYTHROCYTE [DISTWIDTH] IN BLOOD BY AUTOMATED COUNT: 16.7 % (ref 11–15)
GLUCOSE SERPL-MCNC: 89 MG/DL (ref 70–99)
GLUCOSE UR-MCNC: NEGATIVE MG/DL
HCT VFR BLD AUTO: 30.7 % (ref 35–48)
HGB BLD-MCNC: 9.8 G/DL (ref 12–16)
IRON SATN MFR SERPL: 18 % (ref 15–50)
IRON SERPL-MCNC: 44 MCG/DL (ref 28–170)
KETONES UR-MCNC: NEGATIVE MG/DL
LYMPHOCYTES # BLD: 0.6 K/UL (ref 1–4)
LYMPHOCYTES NFR BLD: 7 %
MAGNESIUM SERPL-MCNC: 1.3 MG/DL (ref 1.8–2.5)
MCH RBC QN AUTO: 27.2 PG (ref 27–32)
MCHC RBC AUTO-ENTMCNC: 32 G/DL (ref 32–37)
MCV RBC AUTO: 84.9 FL (ref 80–100)
MICROALBUMIN UR-MCNC: 20.2 MG/DL (ref 0–1.8)
MICROALBUMIN/CREAT UR: 434.4 MG/G{CREAT} (ref 0–20)
MONOCYTES # BLD: 0.7 K/UL (ref 0–1)
MONOCYTES NFR BLD: 8 %
NEUTROPHILS # BLD AUTO: 6.8 K/UL (ref 1.8–7.7)
NEUTROPHILS NFR BLD: 83 %
NITRITE UR QL STRIP.AUTO: NEGATIVE
OSMOLALITY UR CALC.SUM OF ELEC: 290 MOSM/KG (ref 275–295)
PH UR: 6 [PH] (ref 5–8)
PHOSPHATE SERPL-MCNC: 3.5 MG/DL (ref 2.4–4.7)
PLATELET # BLD AUTO: 295 K/UL (ref 140–400)
PMV BLD AUTO: 8 FL (ref 7.4–10.3)
POTASSIUM SERPL-SCNC: 3.9 MMOL/L (ref 3.3–5.1)
PROT SERPL-MCNC: 5.6 G/DL (ref 5.9–8.4)
PROT UR-MCNC: 30 MG/DL
PROT UR-MCNC: 65 MG/DL
RBC # BLD AUTO: 3.61 M/UL (ref 3.7–5.4)
RBC #/AREA URNS AUTO: 186 /HPF
SODIUM SERPL-SCNC: 136 MMOL/L (ref 136–144)
SP GR UR STRIP: 1.01 (ref 1–1.03)
TIBC SERPL-MCNC: 240 MCG/DL (ref 228–428)
TRANSFERRIN SERPL-MCNC: 182 MG/DL (ref 192–382)
UROBILINOGEN UR STRIP-ACNC: <2
VIT C UR-MCNC: NEGATIVE MG/DL
WBC # BLD AUTO: 8.2 K/UL (ref 4–11)
WBC #/AREA URNS AUTO: 61 /HPF

## 2018-03-03 PROCEDURE — 99233 SBSQ HOSP IP/OBS HIGH 50: CPT | Performed by: INTERNAL MEDICINE

## 2018-03-03 PROCEDURE — 76770 US EXAM ABDO BACK WALL COMP: CPT | Performed by: INTERNAL MEDICINE

## 2018-03-03 PROCEDURE — 99233 SBSQ HOSP IP/OBS HIGH 50: CPT | Performed by: HOSPITALIST

## 2018-03-03 RX ORDER — 0.9 % SODIUM CHLORIDE 0.9 %
VIAL (ML) INJECTION
Status: COMPLETED
Start: 2018-03-03 | End: 2018-03-03

## 2018-03-03 RX ORDER — SODIUM CHLORIDE 9 MG/ML
INJECTION, SOLUTION INTRAVENOUS
Status: COMPLETED
Start: 2018-03-03 | End: 2018-03-03

## 2018-03-03 NOTE — PLAN OF CARE
METABOLIC/FLUID AND ELECTROLYTES - ADULT    • Hemodynamic stability and optimal renal function maintained Not Progressing          CARDIOVASCULAR - ADULT    • Maintains optimal cardiac output and hemodynamic stability Progressing    • Absence of cardiac ar

## 2018-03-03 NOTE — PROGRESS NOTES
[facility]  Nephrology Daily Progress Note    Lincoln Levy  [id number]  80year old      HPI:   Lincoln Levy is a 80year old female. Feeling OK. Up in chair. No complaints. SOB better.         ROS:     Constitutional:  Negative f appropriate for age reflexes and motor skills appropriate for age    Labs:    Lab Results  Component Value Date   WBC 8.2 03/03/2018   HGB 9.8 03/03/2018   HCT 30.7 03/03/2018    03/03/2018   CREATSERUM 2.15 03/03/2018   BUN 36 03/03/2018    0 Units, 5,000 Units, Subcutaneous, 2 times per day  •  haloperidol lactate (HALDOL) 5 MG/ML injection 2 mg, 2 mg, Intravenous, Q6H PRN    Allergies:    Pcn [Penicillins]       Unknown    Input/Output:    Intake/Output Summary (Last 24 hours) at 03/03/18 111

## 2018-03-03 NOTE — PROGRESS NOTES
[facility]    Cardiology Progress Note    [place] Patient Status:  Inpatient    1924 MRN [id number]   Location [facility] 3W/SW Attending Fany Oglesby, [address] [facility] Day # 2  [facility] abnormalities   - Continue to correct per PCP    Anemia - Decreased hemoglobin PCP    RECS  - diuresis per renal  - continue BB        Results:       Lab Results  Component Value Date   WBC 8.2 03/03/2018   HGB 9.8 (L) 03/03/2018   HCT 30.7 (L) 03/03/2018

## 2018-03-03 NOTE — PROGRESS NOTES
[place] [facility] - [facility]    Progress Note    [place] Patient Status:  Inpatient    1924 MRN [id number]   Location [facility] 3W/SW Attending Salome Regalado, [address] [facility] Day # 2 PCP Rei Pate       Subjective:   [place] BILT 1.0 04/16/2017   TP 4.9 (L) 04/16/2017   AST 34 04/16/2017   ALT 18 04/16/2017   INR 1.0 07/28/2017   TSH 0.95 08/03/2015   ROCIO 60 04/14/2017   LIP 27 04/16/2017   MG 1.3 (L) 03/03/2018   PHOS 3.5 03/03/2018   TROP 0.03 02/28/2018                 Re Pulmonary arteries: Systolic pressure was moderately increased. PA peak     pressure: 65mm Hg (S). 9. Pericardium, extracardiac: A small pericardial effusion was identified along     the left ventricular free wall.  Moderate pericardial effusion adjacent t inpatient services that will reasonably be expected to span two midnight's based on the clinical documentation in H+P. Based on patients current state of illness, I anticipate that, after discharge, patient will require TBD.

## 2018-03-04 ENCOUNTER — APPOINTMENT (OUTPATIENT)
Dept: GENERAL RADIOLOGY | Facility: HOSPITAL | Age: 83
DRG: 291 | End: 2018-03-04
Attending: INTERNAL MEDICINE
Payer: MEDICARE

## 2018-03-04 ENCOUNTER — APPOINTMENT (OUTPATIENT)
Dept: ULTRASOUND IMAGING | Facility: HOSPITAL | Age: 83
DRG: 291 | End: 2018-03-04
Attending: INTERNAL MEDICINE
Payer: MEDICARE

## 2018-03-04 LAB
ALBUMIN SERPL BCP-MCNC: 1.8 G/DL (ref 3.5–4.8)
ALBUMIN/GLOB SERPL: 0.5 {RATIO} (ref 1–2)
ALP SERPL-CCNC: 100 U/L (ref 32–100)
ALT SERPL-CCNC: 11 U/L (ref 14–54)
ANION GAP SERPL CALC-SCNC: 12 MMOL/L (ref 0–18)
AST SERPL-CCNC: 20 U/L (ref 15–41)
BASOPHILS # BLD: 0.1 K/UL (ref 0–0.2)
BASOPHILS NFR BLD: 1 %
BILIRUB SERPL-MCNC: 0.7 MG/DL (ref 0.3–1.2)
BUN SERPL-MCNC: 42 MG/DL (ref 8–20)
BUN/CREAT SERPL: 20 (ref 10–20)
CALCIUM SERPL-MCNC: 7.8 MG/DL (ref 8.5–10.5)
CHLORIDE SERPL-SCNC: 99 MMOL/L (ref 95–110)
CO2 SERPL-SCNC: 23 MMOL/L (ref 22–32)
CREAT SERPL-MCNC: 2.1 MG/DL (ref 0.5–1.5)
EOSINOPHIL # BLD: 0.1 K/UL (ref 0–0.7)
EOSINOPHIL NFR BLD: 2 %
ERYTHROCYTE [DISTWIDTH] IN BLOOD BY AUTOMATED COUNT: 16.3 % (ref 11–15)
FERRITIN SERPL IA-MCNC: 151 NG/ML (ref 11–307)
GLOBULIN PLAS-MCNC: 3.8 G/DL (ref 2.5–3.7)
GLUCOSE SERPL-MCNC: 89 MG/DL (ref 70–99)
HCT VFR BLD AUTO: 30.3 % (ref 35–48)
HGB BLD-MCNC: 9.9 G/DL (ref 12–16)
INR BLD: 1.1 (ref 0.9–1.2)
LYMPHOCYTES # BLD: 0.5 K/UL (ref 1–4)
LYMPHOCYTES NFR BLD: 7 %
MAGNESIUM SERPL-MCNC: 2 MG/DL (ref 1.8–2.5)
MCH RBC QN AUTO: 27.5 PG (ref 27–32)
MCHC RBC AUTO-ENTMCNC: 32.6 G/DL (ref 32–37)
MCV RBC AUTO: 84.5 FL (ref 80–100)
MONOCYTES # BLD: 0.4 K/UL (ref 0–1)
MONOCYTES NFR BLD: 6 %
NEUTROPHILS # BLD AUTO: 6.5 K/UL (ref 1.8–7.7)
NEUTROPHILS NFR BLD: 86 %
OSMOLALITY UR CALC.SUM OF ELEC: 288 MOSM/KG (ref 275–295)
PHOSPHATE SERPL-MCNC: 3.9 MG/DL (ref 2.4–4.7)
PLATELET # BLD AUTO: 285 K/UL (ref 140–400)
PMV BLD AUTO: 8 FL (ref 7.4–10.3)
POTASSIUM SERPL-SCNC: 3.7 MMOL/L (ref 3.3–5.1)
PROCALCITONIN SERPL-MCNC: 0.26 NG/ML (ref ?–0.11)
PROT SERPL-MCNC: 5.6 G/DL (ref 5.9–8.4)
PROTHROMBIN TIME: 14 SECONDS (ref 11.8–14.5)
RBC # BLD AUTO: 3.58 M/UL (ref 3.7–5.4)
SODIUM SERPL-SCNC: 134 MMOL/L (ref 136–144)
TSH SERPL-ACNC: 4.89 UIU/ML (ref 0.45–5.33)
VIT B12 SERPL-MCNC: 233 PG/ML (ref 181–914)
WBC # BLD AUTO: 7.6 K/UL (ref 4–11)

## 2018-03-04 PROCEDURE — 76705 ECHO EXAM OF ABDOMEN: CPT | Performed by: INTERNAL MEDICINE

## 2018-03-04 PROCEDURE — 71045 X-RAY EXAM CHEST 1 VIEW: CPT | Performed by: INTERNAL MEDICINE

## 2018-03-04 PROCEDURE — 99233 SBSQ HOSP IP/OBS HIGH 50: CPT | Performed by: HOSPITALIST

## 2018-03-04 PROCEDURE — 99233 SBSQ HOSP IP/OBS HIGH 50: CPT | Performed by: INTERNAL MEDICINE

## 2018-03-04 RX ORDER — 0.9 % SODIUM CHLORIDE 0.9 %
VIAL (ML) INJECTION
Status: COMPLETED
Start: 2018-03-04 | End: 2018-03-04

## 2018-03-04 RX ORDER — ALBUMIN (HUMAN) 12.5 G/50ML
25 SOLUTION INTRAVENOUS EVERY 12 HOURS
Status: DISCONTINUED | OUTPATIENT
Start: 2018-03-04 | End: 2018-03-06

## 2018-03-04 RX ORDER — AZITHROMYCIN 250 MG/1
250 TABLET, FILM COATED ORAL DAILY
Status: COMPLETED | OUTPATIENT
Start: 2018-03-05 | End: 2018-03-08

## 2018-03-04 RX ORDER — 0.9 % SODIUM CHLORIDE 0.9 %
VIAL (ML) INJECTION
Status: COMPLETED
Start: 2018-03-04 | End: 2018-03-05

## 2018-03-04 RX ORDER — FUROSEMIDE 10 MG/ML
40 INJECTION INTRAMUSCULAR; INTRAVENOUS
Status: DISCONTINUED | OUTPATIENT
Start: 2018-03-04 | End: 2018-03-06

## 2018-03-04 RX ORDER — AZITHROMYCIN 250 MG/1
500 TABLET, FILM COATED ORAL ONCE
Status: COMPLETED | OUTPATIENT
Start: 2018-03-04 | End: 2018-03-04

## 2018-03-04 NOTE — PROGRESS NOTES
[place] [facility]  Nephrology Daily Progress Note    Waldo Spencer  [id number]  80year old      HPI:   Waldo Spencer is a 80year old female. Has dementia and really has no c/o. Does not want to elevate her legs. Eating fairly well.  Some age    Labs:    Lab Results  Component Value Date   WBC 7.6 03/04/2018   HGB 9.9 03/04/2018   HCT 30.3 03/04/2018    03/04/2018   CREATSERUM 2.10 03/04/2018   BUN 42 03/04/2018    03/04/2018   K 3.7 03/04/2018   CL 99 03/04/2018   CO2 23 03/04 Oral, Once  •  [START ON 3/5/2018] azithromycin (ZITHROMAX) tab 250 mg, 250 mg, Oral, Daily  •  furosemide (LASIX) injection 40 mg, 40 mg, Intravenous, BID (Diuretic)  •  Albumin Human (ALBUMINAR) 25 % solution 25 g, 25 g, Intravenous, Q12H  •  Potassium C Chronic liver disease is contributing to hypoalbuminemia and as a result increases her edema. No significant proteinuria. Will resume Lasix with albumin bid. Hopefully can diurese without worsening renal function. Laurent HOLLINGSWORTH  Discussed with Dr. Maribel Mar

## 2018-03-04 NOTE — PROGRESS NOTES
[place] [facility] - [facility]    Progress Note    [place] Patient Status:  Inpatient    1924 MRN [id number]   Location [facility] 3W/SW Attending Benjie Anglin, [address] [facility] Day # 3 PCP Omar Harding       Subjective:   [place] CL 99 03/04/2018   CO2 23 03/04/2018   GLU 89 03/04/2018   CA 7.8 (L) 03/04/2018   ALB 1.8 (L) 03/04/2018   ALKPHO 100 03/04/2018   BILT 0.7 03/04/2018   TP 5.6 (L) 03/04/2018   AST 20 03/04/2018   ALT 11 (L) 03/04/2018   INR 1.1 03/04/2018   TSH 0.95 08 valve: Mild regurgitation. 3. Mitral valve: Mildly calcified annulus. Mildly thickened leaflets. .     Moderate regurgitation. 4. Left atrium: The atrium was moderately dilated. 5. Right ventricle: The cavity size was markedly dilated.  Systolic function UTI  -rocephin  -culture pending     Benign hypertension with hypertensive heart disease  Blood pressure well controlled we will resume home medication.     Hypokalemia  Likely secondary to diuretics, will initiate electrolyte replacement protocol.

## 2018-03-04 NOTE — PROGRESS NOTES
[place] [facility] - [facility]    Progress Note    [place] Patient Status:  Inpatient    1924 MRN [id number]   Location [facility] 3W/SW Attending Everton Gonzalez MD   Hosp Day # 3 PCP LIO DANIEL         Assessment and Plan:      Ac (L) 03/04/2018   HCT 30.3 (L) 03/04/2018    03/04/2018   CREATSERUM 2.10 (H) 03/04/2018   BUN 42 (H) 03/04/2018    (L) 03/04/2018   K 3.7 03/04/2018   CL 99 03/04/2018   CO2 23 03/04/2018   GLU 89 03/04/2018   CA 7.8 (L) 03/04/2018   ALB 1.8 (

## 2018-03-04 NOTE — PROGRESS NOTES
[facility]    Cardiology Progress Note    [place] Patient Status:  Inpatient    1924 MRN [id number]   Location [facility] 3W/SW Attending Caryn Mcfarland, [address] [facility] Day # 3 PCP Panfilo Rowell       Primary Cardiologis Decreased hemoglobin PCP    RECS  - diuresis per renal  - continue BB  - Liver U/S today        Results:       Lab Results  Component Value Date   WBC 7.6 03/04/2018   HGB 9.9 (L) 03/04/2018   HCT 30.3 (L) 03/04/2018    03/04/2018   CREATSERUM 2.15

## 2018-03-04 NOTE — DIETARY NOTE
NUTRITION - INITIAL ASSESSMENT    Pt is at moderate nutrition risk. Pt does not meet malnutrition criteria.       RECOMMENDATIONS TO MD:  See Nutrition Intervention     NUTRITION DIAGNOSIS/PROBLEM:  Increased nutrient needs related to increased demand for index is 23.13 kg/m².   BMI CLASSIFICATION: 19-24.9 kg/m2 - WNL  IBW: 115 lbs        113% IBW  Usual Body Wt: 130 lbs       100% UBW    WEIGHT HISTORY:  Patient Weight(s) for the past 336 hrs:   Weight   03/03/18 0700 59.2 kg (130 lb 9.6 oz)   03/02/18 0440 calories/day (25-30 calories per kg current wt)  Protein: 62 grams protein/day (1.2 grams protein per kg IBW- ideal body wt.)    MONITOR AND EVALUATE/NUTRITION GOALS:  - Food and Nutrient Intake:      Monitor: PO and supplement tolerance/intake and adequac

## 2018-03-05 PROBLEM — Z71.89 GOALS OF CARE, COUNSELING/DISCUSSION: Status: ACTIVE | Noted: 2018-03-05

## 2018-03-05 PROBLEM — Z71.89 ADVANCE CARE PLANNING: Status: ACTIVE | Noted: 2018-03-05

## 2018-03-05 LAB
ALBUMIN SERPL BCP-MCNC: 2.6 G/DL (ref 3.5–4.8)
ANION GAP SERPL CALC-SCNC: 8 MMOL/L (ref 0–18)
BASOPHILS # BLD: 0.1 K/UL (ref 0–0.2)
BASOPHILS NFR BLD: 1 %
BUN SERPL-MCNC: 44 MG/DL (ref 8–20)
BUN/CREAT SERPL: 21 (ref 10–20)
CALCIUM SERPL-MCNC: 8.3 MG/DL (ref 8.5–10.5)
CHLORIDE SERPL-SCNC: 103 MMOL/L (ref 95–110)
CO2 SERPL-SCNC: 26 MMOL/L (ref 22–32)
CREAT SERPL-MCNC: 2.1 MG/DL (ref 0.5–1.5)
EOSINOPHIL # BLD: 0.1 K/UL (ref 0–0.7)
EOSINOPHIL NFR BLD: 2 %
ERYTHROCYTE [DISTWIDTH] IN BLOOD BY AUTOMATED COUNT: 16.5 % (ref 11–15)
GLUCOSE SERPL-MCNC: 94 MG/DL (ref 70–99)
HCT VFR BLD AUTO: 28.5 % (ref 35–48)
HCV AB SERPL QL IA: NONREACTIVE
HGB BLD-MCNC: 9.1 G/DL (ref 12–16)
LYMPHOCYTES # BLD: 0.4 K/UL (ref 1–4)
LYMPHOCYTES NFR BLD: 5 %
MAGNESIUM SERPL-MCNC: 1.9 MG/DL (ref 1.8–2.5)
MCH RBC QN AUTO: 27.4 PG (ref 27–32)
MCHC RBC AUTO-ENTMCNC: 32.1 G/DL (ref 32–37)
MCV RBC AUTO: 85.5 FL (ref 80–100)
MONOCYTES # BLD: 0.4 K/UL (ref 0–1)
MONOCYTES NFR BLD: 5 %
NEUTROPHILS # BLD AUTO: 6.2 K/UL (ref 1.8–7.7)
NEUTROPHILS NFR BLD: 87 %
OSMOLALITY UR CALC.SUM OF ELEC: 295 MOSM/KG (ref 275–295)
PHOSPHATE SERPL-MCNC: 4.3 MG/DL (ref 2.4–4.7)
PLATELET # BLD AUTO: 286 K/UL (ref 140–400)
PMV BLD AUTO: 8.1 FL (ref 7.4–10.3)
POTASSIUM SERPL-SCNC: 3.8 MMOL/L (ref 3.3–5.1)
RBC # BLD AUTO: 3.33 M/UL (ref 3.7–5.4)
SODIUM SERPL-SCNC: 137 MMOL/L (ref 136–144)
WBC # BLD AUTO: 7.1 K/UL (ref 4–11)

## 2018-03-05 PROCEDURE — 99222 1ST HOSP IP/OBS MODERATE 55: CPT | Performed by: REGISTERED NURSE

## 2018-03-05 PROCEDURE — 99233 SBSQ HOSP IP/OBS HIGH 50: CPT | Performed by: HOSPITALIST

## 2018-03-05 PROCEDURE — 99233 SBSQ HOSP IP/OBS HIGH 50: CPT | Performed by: INTERNAL MEDICINE

## 2018-03-05 RX ORDER — MORPHINE SULFATE 20 MG/ML
5 SOLUTION ORAL EVERY 4 HOURS PRN
Status: DISCONTINUED | OUTPATIENT
Start: 2018-03-05 | End: 2018-03-09

## 2018-03-05 RX ORDER — POTASSIUM CHLORIDE 20 MEQ/1
20 TABLET, EXTENDED RELEASE ORAL ONCE
Status: DISCONTINUED | OUTPATIENT
Start: 2018-03-05 | End: 2018-03-07

## 2018-03-05 RX ORDER — 0.9 % SODIUM CHLORIDE 0.9 %
VIAL (ML) INJECTION
Status: COMPLETED
Start: 2018-03-05 | End: 2018-03-05

## 2018-03-05 NOTE — PROGRESS NOTES
[facility]    Cardiology Progress Note    [place] Patient Status:  Inpatient    1924 MRN [id number]   Location [facility] 3W/SW Attending Leonel Stack, [address] [facility] Day # 4 PCP Issac Smith       Primary Cardiologis Intravenous Q24H   • Memantine HCl  10 mg Oral BID   • metoprolol Tartrate  12.5 mg Oral BID   • Heparin Sodium (Porcine)  5,000 Units Subcutaneous 2 times per day       Continuous Infusions:     Exam  Gen: No acute distress, alert   Pulm: Lungs clear  Nec perihepatic ascites. Upper limits of normal in size the right lobe. Status post cholecystectomy. No biliary obstruction. Normal visualized pancreas. Xr Chest Ap Portable  (cpt=71045)    Result Date: 3/4/2018  CONCLUSION:  1.  Mild cardiomegaly with

## 2018-03-05 NOTE — PROGRESS NOTES
[place] [facility] - [facility]    Progress Note      Subjective:     Not much verbal       Review of Systems:     Limited given dementia     Constitutional: weakness   Respiratory: negative for cough, hemoptysis and wheezing  Cardiovascular: + LE edema   Gas Intravenous Q6H PRN   acetaminophen (TYLENOL) tab 650 mg 650 mg Oral Q6H PRN   Heparin Sodium (Porcine) 5000 UNIT/ML injection 5,000 Units 5,000 Units Subcutaneous 2 times per day   haloperidol lactate (HALDOL) 5 MG/ML injection 2 mg 2 mg Intravenous Q6H P No biliary obstruction. Normal visualized pancreas. Xr Chest Ap Portable  (cpt=71045)    Result Date: 3/4/2018  CONCLUSION:  1. Mild cardiomegaly with worsening moderate interstitial pulmonary edema.  Persistent moderate right base consolidation wi

## 2018-03-05 NOTE — PALLIATIVE CARE NOTE
Pt seen in room for initial palliative care assessment. [place] was up in chair eating lunch. Appears generally comfortable. Very pleasant. Denies pain or dyspnea. On room air. Bilateral LE edema noted. Pt denies discomfort from edema.   Pt states s wishes would be more consistent with DNR status at end of life situation. Sandra Ayala confirmed that she would want DNR status for her mother.   Discussed importance of completing POLST form prior to discharge to make sure that wishes are respected across [address]

## 2018-03-05 NOTE — CONSULTS
[address] [place] [address] Patient Status:  Inpatient    1924 MRN [id number]   Location [facility] 3W/SW Attending Damaris Middleton, [address] [facility] Day # 4 [facility]     Date of Consult: Denies cough or lightheadedness/dizziness. Patient denies any current pain issues. Appetite has been fair, current BMI is 23, weight is 131 lbs. Patient has had wt gain related to worsening edema.   Denies any abdominal pain, n/v and moved her bowels 2 days vs benefits of life sustaining measures in the setting of advanced age/comorbidities. Dtr expressed wishes for DNR, DNI, no feeding tubes, no invasive procedures and prefers NO rehospitalization. Dtr appears on board with hospice philosophy.  I discussed th mEq, 20 mEq, Oral, Once  •  Morphine Sulfate (Concentrate) concentrated solution 5 mg, 5 mg, Oral, Q4H PRN  •  azithromycin (ZITHROMAX) tab 250 mg, 250 mg, Oral, Daily  •  furosemide (LASIX) injection 40 mg, 40 mg, Intravenous, BID (Diuretic)  •  Albumin H hepatic parenchymal disease. Trace perihepatic ascites. Upper limits of normal in size the right lobe. Status post cholecystectomy. No biliary obstruction. Normal visualized pancreas.           Xr Chest Ap Portable  (cpt=71045)    Result Date: 3/4/2018  CON [address] Davies Agent's Phone Number: [phone]  Pre-existing DNR/DNI Order: Yes, notify physician for order  Describe Patient Wishes: DNR,DNI, no g-tube, prefers no rehospitalization with comfort focus    Spiritual needs addressed: Referral placed for Follow Up:    A total of 55 mins  were spent on this consult, which included all of the following:direct face to face contact, history taking, physical examination, and >50% was spent counseling and coordinating care.     Discussed today's visit with Dr. Boogie Julien

## 2018-03-05 NOTE — PROGRESS NOTES
03/05/18 4641   Clinical Encounter Type   Visited With Patient   Routine Visit Follow-up  (CONSULT)   Continue Visiting Yes   Patient's Supportive Strategies/Resources Family and Hattie   Baptism Encounters   Baptism Needs Prayer   Spiritual Requests

## 2018-03-05 NOTE — PROGRESS NOTES
[place] [facility] - [facility]    Progress Note    [place] Patient Status:  Inpatient    1924 MRN [id number]   Location [facility] 3W/SW Attending Thang Goldstein, [address] [facility] Day # 4 PCP Oralia Rivera       Subjective:   [place] HGB 9.1 (L) 03/05/2018   HCT 28.5 (L) 03/05/2018    03/05/2018   CREATSERUM 2.10 (H) 03/05/2018   BUN 44 (H) 03/05/2018    03/05/2018   K 3.8 03/05/2018    03/05/2018   CO2 26 03/05/2018   GLU 94 03/05/2018   CA 8.3 (L) 03/05/2018   AL chronic diastolic heart failure along with right heart failure  - echo shows wide open TR, RV dilation, increased PA pressure. - negative overall. Creat stable. Cont to hold lasix for now.  Renal is on consult and will manage diuretics- cont on albumin an

## 2018-03-05 NOTE — CM/SW NOTE
3/5/18 CM Discharge planning / MDO for palliative care   Spoke with dtr Lavonne Moss via phone, agreed to o/p palliative care at [facility]. Referral made to Tono Simmons for o/p palliative care via [employer].   Spoke with Elisha cole at [facility] rehab b

## 2018-03-06 LAB
ANION GAP SERPL CALC-SCNC: 9 MMOL/L (ref 0–18)
BASOPHILS # BLD: 0.1 K/UL (ref 0–0.2)
BASOPHILS NFR BLD: 1 %
BUN SERPL-MCNC: 46 MG/DL (ref 8–20)
BUN/CREAT SERPL: 18.9 (ref 10–20)
CALCIUM SERPL-MCNC: 8.5 MG/DL (ref 8.5–10.5)
CHLORIDE SERPL-SCNC: 103 MMOL/L (ref 95–110)
CO2 SERPL-SCNC: 26 MMOL/L (ref 22–32)
CREAT SERPL-MCNC: 2.43 MG/DL (ref 0.5–1.5)
EOSINOPHIL # BLD: 0.1 K/UL (ref 0–0.7)
EOSINOPHIL NFR BLD: 2 %
ERYTHROCYTE [DISTWIDTH] IN BLOOD BY AUTOMATED COUNT: 16.7 % (ref 11–15)
GLUCOSE SERPL-MCNC: 96 MG/DL (ref 70–99)
HCT VFR BLD AUTO: 28 % (ref 35–48)
HGB BLD-MCNC: 8.9 G/DL (ref 12–16)
LYMPHOCYTES # BLD: 0.4 K/UL (ref 1–4)
LYMPHOCYTES NFR BLD: 5 %
MCH RBC QN AUTO: 27.4 PG (ref 27–32)
MCHC RBC AUTO-ENTMCNC: 31.9 G/DL (ref 32–37)
MCV RBC AUTO: 85.8 FL (ref 80–100)
MONOCYTES # BLD: 0.4 K/UL (ref 0–1)
MONOCYTES NFR BLD: 6 %
NEUTROPHILS # BLD AUTO: 5.8 K/UL (ref 1.8–7.7)
NEUTROPHILS NFR BLD: 86 %
OSMOLALITY UR CALC.SUM OF ELEC: 298 MOSM/KG (ref 275–295)
PLATELET # BLD AUTO: 270 K/UL (ref 140–400)
PMV BLD AUTO: 8.2 FL (ref 7.4–10.3)
POTASSIUM SERPL-SCNC: 3.7 MMOL/L (ref 3.3–5.1)
POTASSIUM SERPL-SCNC: 3.7 MMOL/L (ref 3.3–5.1)
RBC # BLD AUTO: 3.26 M/UL (ref 3.7–5.4)
SODIUM SERPL-SCNC: 138 MMOL/L (ref 136–144)
WBC # BLD AUTO: 6.7 K/UL (ref 4–11)

## 2018-03-06 PROCEDURE — 99233 SBSQ HOSP IP/OBS HIGH 50: CPT | Performed by: INTERNAL MEDICINE

## 2018-03-06 PROCEDURE — 99232 SBSQ HOSP IP/OBS MODERATE 35: CPT | Performed by: HOSPITALIST

## 2018-03-06 PROCEDURE — 99231 SBSQ HOSP IP/OBS SF/LOW 25: CPT | Performed by: REGISTERED NURSE

## 2018-03-06 RX ORDER — ALBUMIN (HUMAN) 12.5 G/50ML
12.5 SOLUTION INTRAVENOUS EVERY 8 HOURS
Status: DISPENSED | OUTPATIENT
Start: 2018-03-06 | End: 2018-03-07

## 2018-03-06 RX ORDER — 0.9 % SODIUM CHLORIDE 0.9 %
VIAL (ML) INJECTION
Status: COMPLETED
Start: 2018-03-06 | End: 2018-03-06

## 2018-03-06 NOTE — CONSULTS
[address], [facility]  Patient Status:  Inpatient    1924 MRN [id number]   Location [facility] 3W/SW Attending Bharti Hough, [address] [facility] Day # 5 PCP Tam Isaac     Date of Consult:  mg, 10 mg, Oral, BID  •  metoprolol Tartrate (LOPRESSOR) tab 12.5 mg, 12.5 mg, Oral, BID  •  TraMADol HCl (ULTRAM) tab 50 mg, 50 mg, Oral, Q12H PRN  •  ondansetron HCl (ZOFRAN) injection 4 mg, 4 mg, Intravenous, Q6H PRN  •  acetaminophen (TYLENOL) tab 650 prominence noted  HEENT: No focal deficits. Cardiac: Irregular rate and rhythm, S1, S2 normal, no murmur, rub or gallop. Lungs: Diminished bilaterally. Normal excursions and effort.   Abdomen: Soft, non-tender, normal bowel sounds X 4 quadrants, no rebou care/bridge to hospice programs  -Pt is DNR, DNI, no g-tube, no invasive procedures and no rehospitalization with comfort focus.  -Plan for rehab with palliative care with a bridge to hospice care.  - referral for spiritual support  -Provided emoti

## 2018-03-06 NOTE — PLAN OF CARE
Problem: Patient/Family Goals  Goal: Patient/Family Long Term Goal  Patient's Long Term Goal: return home to baseline health    Interventions:  -take all meds prescribed at dc  -follow up with all dr apps  -eat a heart healthy diet low in sodium  - See add indicated  - Evaluate effectiveness of antiarrhythmic and heart rate control medications as ordered  - Initiate emergency measures for life threatening arrhythmias  - Monitor electrolytes and administer replacement therapy as ordered   Outcome: Progressing

## 2018-03-06 NOTE — PROGRESS NOTES
[place] [facility] - [facility]    Progress Note      Subjective:     Not much verbal       Review of Systems:     Limited given dementia     Constitutional: weakness   Respiratory: negative for cough, hemoptysis and wheezing  Cardiovascular: + LE edema   Gas ondansetron HCl (ZOFRAN) injection 4 mg 4 mg Intravenous Q6H PRN   acetaminophen (TYLENOL) tab 650 mg 650 mg Oral Q6H PRN   Heparin Sodium (Porcine) 5000 UNIT/ML injection 5,000 Units 5,000 Units Subcutaneous 2 times per day   haloperidol lactate (HALDOL lasix BID - hold   - avoid nephrotoxins     2. Bilateral LE edema   - secondary to low albumin, Tricuspid regurgitation  - Echo results noted with no wall motion abnormality.  +ve moderate MR and TR   - not candidate for open heart   - evaluate for tricuspi

## 2018-03-06 NOTE — PROGRESS NOTES
[place] [facility] - [facility]    Progress Note    [place] Patient Status:  Inpatient    1924 MRN [id number]   Location [facility] 3W/SW Attending Isidoro Mcallister, 160[address] [facility] Day # 5 PCP Freda List       Subjective:   [place] 03/06/2018   CREATSERUM 2.43 (H) 03/06/2018   BUN 46 (H) 03/06/2018    03/06/2018   K 3.7 03/06/2018   K 3.7 03/06/2018    03/06/2018   CO2 26 03/06/2018   GLU 96 03/06/2018   CA 8.5 03/06/2018   ALB 2.6 (L) 03/05/2018   ALKPHO 100 03/04/2018 u/s ordered and neg for hydo     Lower ext edema  Multifactorial  ?realted to HFwpEF, possibly related to liver cirrhosis, low albumin; US liver showing ascites   -LFTs ok  -UPEP pending  -hep B recently checked and negative;  Hep C nonreactive   -AMA and A

## 2018-03-06 NOTE — PROGRESS NOTES
[facility] Cardiology Progress Note      [place] Patient Status:  Inpatient    1924 MRN [id number]   Location [facility] 3W/SW Attending Hilaria Stacy, [address] [facility] Day # 5 PCP LIO JESUS respiratory effort  CV:Heart with regular rate and irregularly irregular rhythm, Nl S1,S2 ,no S3 or murmur  Abd: Abdomen soft, nontender, nondistended, no organomegaly, bowel sounds present  Ext:  no clubbing, no cyanosis, bilat 2+ LE  edema to knees  Neur

## 2018-03-07 ENCOUNTER — TELEPHONE (OUTPATIENT)
Dept: CARDIOLOGY CLINIC | Facility: CLINIC | Age: 83
End: 2018-03-07

## 2018-03-07 LAB
ANION GAP SERPL CALC-SCNC: 15 MMOL/L (ref 0–18)
BUN SERPL-MCNC: 49 MG/DL (ref 8–20)
BUN/CREAT SERPL: 21.7 (ref 10–20)
CALCIUM SERPL-MCNC: 8.4 MG/DL (ref 8.5–10.5)
CHLORIDE SERPL-SCNC: 103 MMOL/L (ref 95–110)
CO2 SERPL-SCNC: 19 MMOL/L (ref 22–32)
CREAT SERPL-MCNC: 2.26 MG/DL (ref 0.5–1.5)
GLUCOSE SERPL-MCNC: 92 MG/DL (ref 70–99)
OSMOLALITY UR CALC.SUM OF ELEC: 297 MOSM/KG (ref 275–295)
POTASSIUM SERPL-SCNC: 3.8 MMOL/L (ref 3.3–5.1)
SODIUM SERPL-SCNC: 137 MMOL/L (ref 136–144)

## 2018-03-07 PROCEDURE — 99232 SBSQ HOSP IP/OBS MODERATE 35: CPT | Performed by: REGISTERED NURSE

## 2018-03-07 PROCEDURE — 99233 SBSQ HOSP IP/OBS HIGH 50: CPT | Performed by: HOSPITALIST

## 2018-03-07 PROCEDURE — 99232 SBSQ HOSP IP/OBS MODERATE 35: CPT | Performed by: INTERNAL MEDICINE

## 2018-03-07 RX ORDER — POTASSIUM CHLORIDE 20 MEQ/1
20 TABLET, EXTENDED RELEASE ORAL ONCE
Status: DISCONTINUED | OUTPATIENT
Start: 2018-03-07 | End: 2018-03-09

## 2018-03-07 RX ORDER — 0.9 % SODIUM CHLORIDE 0.9 %
VIAL (ML) INJECTION
Status: COMPLETED
Start: 2018-03-07 | End: 2018-03-07

## 2018-03-07 NOTE — PROGRESS NOTES
03/07/18 1400   Clinical Encounter Type   Visited With Patient   Routine Visit Follow-up   Continue Visiting Yes   Referral From Other (Comment)  (Palliative Care)   Patient Spiritual Encounters   Spiritual Assessment Completed 1   Spiritual Needs Prese

## 2018-03-07 NOTE — PROGRESS NOTES
[facility] Cardiology Progress Note      [place] Patient Status:  Inpatient    1924 MRN [id number]   Location [facility] 3W/SW Attending Liset Guardado, [address] [facility] Day # 6 PCP LIO JESUS kg)  09/22/17 : 119 lb (54 kg)  07/27/17 : 130 lb (59 kg)  05/01/17 : 125 lb (56.7 kg)  04/25/17 : 129 lb (58.5 kg)  04/17/17 : 134 lb 9.6 oz (61.1 kg)       Exam  Gen: No acute distress, alert    Neck: + JVD  Pulm: Lungs clear, normal respiratory effort

## 2018-03-07 NOTE — PROGRESS NOTES
[facility] - [facility]    Progress Note    [place] Patient Status:  Inpatient    1924 MRN [id number]   Location [facility] 3W/SW Attending Arturo Mensah Day # 6 PCP LIO DANIEL     Subjective:     Unable to    5.UTI without sepsis  -rocephin  -culture pending     6. Benign hypertension with hypertensive heart disease  Blood pressure well controlled we will resume home medication.     7. Hypokalemia  Likely secondary to diuretics, will initiate electrolyte rep

## 2018-03-07 NOTE — TELEPHONE ENCOUNTER
Pt is in hospital now and she discuss with her attending. Tricuspid regurg has been present since 2015 and not a lot we can do for it.  If she has further questions she should f/u with Dr. Maribel Lutz

## 2018-03-07 NOTE — PHYSICAL THERAPY NOTE
PHYSICAL THERAPY TREATMENT NOTE - INPATIENT     Room Number: 830/451-A       Presenting Problem:  (Acute on CHF,Weakness,H/O Dementia,falls,early Parkison's)    Problem List  Principal Problem:    Acute on chronic diastolic congestive heart failure ([facility] [address] blankets)?: A Little   -   Sitting down on and standing up from a chair with arms (e.g., wheelchair, bedside commode, etc.): A Little   -   Moving from lying on back to sitting on the side of the bed?: A Little   How much help from another person does the Current Status  NT   Goal #5 Patient to demonstrate independence with home activity/exercise instructions provided to patient in preparation for discharge.    Goal #5   Current Status  In progress   Goal #6     Goal #6  Current Status

## 2018-03-07 NOTE — TELEPHONE ENCOUNTER
Paged Dr. Liz Palmer. Per Dr. Liz Palmer will go see her. Informed Daugher. Daughter states she is on her way there now.    Phone number given to Dr. Liz Palmer

## 2018-03-07 NOTE — PROGRESS NOTES
[facility]  Nephrology Daily Progress Note    Aston Colindres  [id number]  80year old      HPI:   Aston Colindres is a 80year old female. Pleasantly confused. Up in chair. No SOB.        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

## 2018-03-07 NOTE — TELEPHONE ENCOUNTER
Informed Daughter Jenelle Hernandez message below. Daughter is demanding she speak to Dr. Calvin Toledo today in regards to discuss  for pt to have surgery for leaky valve.  Please advise

## 2018-03-07 NOTE — TELEPHONE ENCOUNTER
Pt daughter states pt is in hospital. States tricuspid valve has serious leak need to know if doing the right thing by not treating it.  Please call thank you

## 2018-03-07 NOTE — CM/SW NOTE
3/7/18 CM Discharge planning   Updated medical records faxed to [facility]. Spoke with Elisha cole at [facility] bed available for pt when medically stable.     Spoke with Myke Bowens at [facility] Pasha Burgos will follow up with pt at [facility] for pa

## 2018-03-07 NOTE — CONSULTS
[address], Phs/Ihs  Patient Status:  Inpatient    1924 MRN [id number]   Location [facility] 3W/SW Attending Lanette Harvey, [address] [facility] Day # 6 PCP Desikarla Sanders     Date of Consult:  Q8H  •  Morphine Sulfate (Concentrate) concentrated solution 5 mg, 5 mg, Oral, Q4H PRN  •  azithromycin (ZITHROMAX) tab 250 mg, 250 mg, Oral, Daily  •  Potassium Chloride ER (K-DUR) CR tab 30 mEq, 30 mEq, Oral, Daily  •  CefTRIAXone Sodium (ROCEPHIN) 1 g i prominence noted  HEENT: No focal deficits. Cardiac: Irregular rate and rhythm, S1, S2 normal, no murmur, rub or gallop. Lungs: Diminished bilaterally. Normal excursions and effort.   Abdomen: Soft, non-tender, normal bowel sounds X 4 quadrants, no rebou for rehab with [facility] palliative care with a bridge to hospice care.   -Dtr needs ongoing emotional support as she under a lot of caregiver strain.  - referral for spiritual support  -Provided emotional support to pt/dtr who are coping adequate

## 2018-03-08 LAB
ALBUMIN SERPL BCP-MCNC: 2.8 G/DL (ref 3.5–4.8)
ANION GAP SERPL CALC-SCNC: 10 MMOL/L (ref 0–18)
BASOPHILS # BLD: 0.1 K/UL (ref 0–0.2)
BASOPHILS NFR BLD: 1 %
BUN SERPL-MCNC: 51 MG/DL (ref 8–20)
BUN/CREAT SERPL: 24.6 (ref 10–20)
CALCIUM SERPL-MCNC: 8.4 MG/DL (ref 8.5–10.5)
CHLORIDE SERPL-SCNC: 103 MMOL/L (ref 95–110)
CO2 SERPL-SCNC: 24 MMOL/L (ref 22–32)
CREAT SERPL-MCNC: 2.07 MG/DL (ref 0.5–1.5)
EOSINOPHIL # BLD: 0.1 K/UL (ref 0–0.7)
EOSINOPHIL NFR BLD: 2 %
ERYTHROCYTE [DISTWIDTH] IN BLOOD BY AUTOMATED COUNT: 16.3 % (ref 11–15)
GLUCOSE SERPL-MCNC: 91 MG/DL (ref 70–99)
HCT VFR BLD AUTO: 27.5 % (ref 35–48)
HGB BLD-MCNC: 8.9 G/DL (ref 12–16)
LYMPHOCYTES # BLD: 0.3 K/UL (ref 1–4)
LYMPHOCYTES NFR BLD: 5 %
MAGNESIUM SERPL-MCNC: 1.6 MG/DL (ref 1.8–2.5)
MCH RBC QN AUTO: 27.7 PG (ref 27–32)
MCHC RBC AUTO-ENTMCNC: 32.2 G/DL (ref 32–37)
MCV RBC AUTO: 85.9 FL (ref 80–100)
MONOCYTES # BLD: 0.4 K/UL (ref 0–1)
MONOCYTES NFR BLD: 6 %
NEUTROPHILS # BLD AUTO: 5.7 K/UL (ref 1.8–7.7)
NEUTROPHILS NFR BLD: 86 %
OSMOLALITY UR CALC.SUM OF ELEC: 297 MOSM/KG (ref 275–295)
PHOSPHATE SERPL-MCNC: 4.1 MG/DL (ref 2.4–4.7)
PLATELET # BLD AUTO: 273 K/UL (ref 140–400)
PMV BLD AUTO: 8.4 FL (ref 7.4–10.3)
POTASSIUM SERPL-SCNC: 3.7 MMOL/L (ref 3.3–5.1)
RBC # BLD AUTO: 3.2 M/UL (ref 3.7–5.4)
SODIUM SERPL-SCNC: 137 MMOL/L (ref 136–144)
WBC # BLD AUTO: 6.6 K/UL (ref 4–11)

## 2018-03-08 PROCEDURE — 99232 SBSQ HOSP IP/OBS MODERATE 35: CPT | Performed by: REGISTERED NURSE

## 2018-03-08 PROCEDURE — 99233 SBSQ HOSP IP/OBS HIGH 50: CPT | Performed by: INTERNAL MEDICINE

## 2018-03-08 PROCEDURE — 99233 SBSQ HOSP IP/OBS HIGH 50: CPT | Performed by: HOSPITALIST

## 2018-03-08 RX ORDER — 0.9 % SODIUM CHLORIDE 0.9 %
VIAL (ML) INJECTION
Status: COMPLETED
Start: 2018-03-08 | End: 2018-03-08

## 2018-03-08 RX ORDER — MAGNESIUM OXIDE 400 MG (241.3 MG MAGNESIUM) TABLET
400 TABLET ONCE
Status: COMPLETED | OUTPATIENT
Start: 2018-03-08 | End: 2018-03-08

## 2018-03-08 RX ORDER — FUROSEMIDE 40 MG/1
40 TABLET ORAL
Status: DISCONTINUED | OUTPATIENT
Start: 2018-03-08 | End: 2018-03-09

## 2018-03-08 RX ORDER — ALBUMIN (HUMAN) 12.5 G/50ML
12.5 SOLUTION INTRAVENOUS EVERY 12 HOURS
Status: DISCONTINUED | OUTPATIENT
Start: 2018-03-08 | End: 2018-03-09

## 2018-03-08 NOTE — CM/SW NOTE
3/8CM-MD orders received in regards to discharge planning. The Patient's MD informed this Writer that the Patient will be medically stable tomorrow (3/9) for Miguel Cheung.  This Writer informed Miguel Cheung and requested Haroon GARCIA to send out updates

## 2018-03-08 NOTE — CONSULTS
[address], Phs/Ihs  Patient Status:  Inpatient    1924 MRN [id number]   Location [facility] 3W/SW Attending Koko Ba, [address] [facility] Day # 7 PCP Zeke Anderson     Date of Consult:  100 mL MBP/ADD-vantage, 1 g, Intravenous, Q24H  •  Memantine HCl (NAMENDA) tab 10 mg, 10 mg, Oral, BID  •  metoprolol Tartrate (LOPRESSOR) tab 12.5 mg, 12.5 mg, Oral, BID  •  TraMADol HCl (ULTRAM) tab 50 mg, 50 mg, Oral, Q12H PRN  •  ondansetron HCl (ZOFRA guarding  Extremities: Without clubbing, cyanosis. Peripheral pulses are 2+. 2-3+ pitting BLE edema  Neurologic: Alert and confused oriented to self and place, follows commands  Skin: Warm and dry.     Palliative Performance Scale : 40%    Palliative Care G her [de-identified] #[phone]    Palliative Performance Scale 40%  -CHF, severe TVR with no plan on intervention, +NAZARIO,  HARESH, AFib, advancing dementia, PNA, CAD, h/o colon ca, advanced age, frequent rehospitalizations, significant functional/cognitive decline wi

## 2018-03-08 NOTE — PROGRESS NOTES
[facility]  Cardiology Progress Note    [place] Patient Status:  Inpatient    1924 MRN [id number]   Location [facility] 3W/SW Attending Arturo Mensahissa Day # 7 PCP LIO DANIEL       Subjective: Denies any sy 8.4*   MG   --    --   1.6*   PHOS   --    --   4.1   GLU  96  92  91       Recent Labs   Lab  03/08/18   0621   ALB  2.8*       No results for input(s): TROP in the last 72 hours.     Allergies:     Pcn [Penicillins]       Unknown    Medications:    Inetta Fairly

## 2018-03-08 NOTE — PROGRESS NOTES
[place] [facility] - [facility]    Progress Note      Subjective:     Not much verbal       Review of Systems:     Limited given dementia     Constitutional: weakness   Respiratory: negative for cough, hemoptysis and wheezing  Cardiovascular: + LE edema   Gas (Porcine) 5000 UNIT/ML injection 5,000 Units 5,000 Units Subcutaneous 2 times per day   haloperidol lactate (HALDOL) 5 MG/ML injection 2 mg 2 mg Intravenous Q6H PRN        Results:     Recent Labs   Lab  03/06/18   0553  03/08/18   0621   RBC  3.26*  3.20* hospice    Cee Messer MD  3/8/2018

## 2018-03-08 NOTE — PHYSICAL THERAPY NOTE
PHYSICAL THERAPY TREATMENT NOTE - INPATIENT     Room Number: 994/231-F       Presenting Problem:  (Acute on CHF,Weakness,H/O Dementia,falls,early Parkison's)    Problem List  Principal Problem:    Acute on chronic diastolic congestive heart failure ([facility] [address] promotion;Breathing techniques;Repositioning    BALANCE                                                                                                                     Static Sitting: Fair +  Dynamic Sitting: Fair           Static Standing: Fair -  Dyn Goal #2 Patient is able to demonstrate transfers EOB to/from Chair/Wheelchair at assistance level: supervision with walker - rolling   Goal #2  Current Status Sit to stand :  Mod Ax1 from chair with verbal/tactile cues for sequencing     Goal #3 Patient i

## 2018-03-09 VITALS
OXYGEN SATURATION: 97 % | SYSTOLIC BLOOD PRESSURE: 103 MMHG | WEIGHT: 129 LBS | DIASTOLIC BLOOD PRESSURE: 37 MMHG | HEART RATE: 88 BPM | RESPIRATION RATE: 20 BRPM | BODY MASS INDEX: 22.86 KG/M2 | TEMPERATURE: 98 F | HEIGHT: 63 IN

## 2018-03-09 LAB
ANION GAP SERPL CALC-SCNC: 9 MMOL/L (ref 0–18)
BASOPHILS # BLD: 0.1 K/UL (ref 0–0.2)
BASOPHILS NFR BLD: 1 %
BUN SERPL-MCNC: 52 MG/DL (ref 8–20)
BUN/CREAT SERPL: 24.2 (ref 10–20)
CALCIUM SERPL-MCNC: 8.4 MG/DL (ref 8.5–10.5)
CHLORIDE SERPL-SCNC: 104 MMOL/L (ref 95–110)
CO2 SERPL-SCNC: 24 MMOL/L (ref 22–32)
CREAT SERPL-MCNC: 2.15 MG/DL (ref 0.5–1.5)
EOSINOPHIL # BLD: 0.1 K/UL (ref 0–0.7)
EOSINOPHIL NFR BLD: 2 %
ERYTHROCYTE [DISTWIDTH] IN BLOOD BY AUTOMATED COUNT: 16.3 % (ref 11–15)
GLUCOSE SERPL-MCNC: 90 MG/DL (ref 70–99)
HCT VFR BLD AUTO: 26.8 % (ref 35–48)
HGB BLD-MCNC: 8.6 G/DL (ref 12–16)
LYMPHOCYTES # BLD: 0.6 K/UL (ref 1–4)
LYMPHOCYTES NFR BLD: 9 %
MAGNESIUM SERPL-MCNC: 1.6 MG/DL (ref 1.8–2.5)
MCH RBC QN AUTO: 27.4 PG (ref 27–32)
MCHC RBC AUTO-ENTMCNC: 32 G/DL (ref 32–37)
MCV RBC AUTO: 85.6 FL (ref 80–100)
MONOCYTES # BLD: 0.3 K/UL (ref 0–1)
MONOCYTES NFR BLD: 5 %
NEUTROPHILS # BLD AUTO: 6 K/UL (ref 1.8–7.7)
NEUTROPHILS NFR BLD: 84 %
OSMOLALITY UR CALC.SUM OF ELEC: 298 MOSM/KG (ref 275–295)
PLATELET # BLD AUTO: 265 K/UL (ref 140–400)
PMV BLD AUTO: 8 FL (ref 7.4–10.3)
POTASSIUM SERPL-SCNC: 4.3 MMOL/L (ref 3.3–5.1)
RBC # BLD AUTO: 3.13 M/UL (ref 3.7–5.4)
SODIUM SERPL-SCNC: 137 MMOL/L (ref 136–144)
WBC # BLD AUTO: 7.2 K/UL (ref 4–11)

## 2018-03-09 PROCEDURE — 99239 HOSP IP/OBS DSCHRG MGMT >30: CPT | Performed by: HOSPITALIST

## 2018-03-09 PROCEDURE — 99232 SBSQ HOSP IP/OBS MODERATE 35: CPT | Performed by: REGISTERED NURSE

## 2018-03-09 PROCEDURE — 99233 SBSQ HOSP IP/OBS HIGH 50: CPT | Performed by: INTERNAL MEDICINE

## 2018-03-09 RX ORDER — BISACODYL 10 MG
10 SUPPOSITORY, RECTAL RECTAL
Status: DISCONTINUED | OUTPATIENT
Start: 2018-03-09 | End: 2018-03-09

## 2018-03-09 RX ORDER — MAGNESIUM OXIDE 400 MG (241.3 MG MAGNESIUM) TABLET
400 TABLET ONCE
Status: COMPLETED | OUTPATIENT
Start: 2018-03-09 | End: 2018-03-09

## 2018-03-09 RX ORDER — BISACODYL 10 MG
10 SUPPOSITORY, RECTAL RECTAL
Qty: 10 SUPPOSITORY | Refills: 0 | Status: SHIPPED | OUTPATIENT
Start: 2018-03-09

## 2018-03-09 RX ORDER — MORPHINE SULFATE 20 MG/ML
6 SOLUTION ORAL EVERY 4 HOURS PRN
Qty: 30 ML | Refills: 0 | Status: SHIPPED | OUTPATIENT
Start: 2018-03-09 | End: 2018-04-08

## 2018-03-09 RX ORDER — MEMANTINE HYDROCHLORIDE 10 MG/1
10 TABLET ORAL 2 TIMES DAILY
Qty: 30 TABLET | Refills: 0 | Status: SHIPPED | OUTPATIENT
Start: 2018-03-09

## 2018-03-09 RX ORDER — ACETAMINOPHEN 325 MG/1
650 TABLET ORAL EVERY 6 HOURS PRN
Qty: 30 TABLET | Refills: 0 | Status: SHIPPED | OUTPATIENT
Start: 2018-03-09

## 2018-03-09 RX ORDER — ALBUTEROL SULFATE 2.5 MG/3ML
2.5 SOLUTION RESPIRATORY (INHALATION) EVERY 4 HOURS PRN
Qty: 30 AMPULE | Refills: 0 | Status: SHIPPED | OUTPATIENT
Start: 2018-03-09

## 2018-03-09 RX ORDER — FUROSEMIDE 40 MG/1
40 TABLET ORAL DAILY
Qty: 30 TABLET | Refills: 0 | Status: SHIPPED | OUTPATIENT
Start: 2018-03-09

## 2018-03-09 NOTE — PROGRESS NOTES
[place] [facility]    Progress Note      Subjective:     More alert today.  No new complaints      Review of Systems:     Limited given dementia     Constitutional: weakness   Respiratory: negative for cough, hemoptysis and wheezing  Cardiovascula Oral Q12H PRN   ondansetron HCl (ZOFRAN) injection 4 mg 4 mg Intravenous Q6H PRN   acetaminophen (TYLENOL) tab 650 mg 650 mg Oral Q6H PRN   Heparin Sodium (Porcine) 5000 UNIT/ML injection 5,000 Units 5,000 Units Subcutaneous 2 times per day   haloperidol l not candidate for open heart   - family wants conservative management and not tricuspid valve clips    Plan for palliative care discharge with bridge to hospice.  Discussed with palliative care team    Brissa Jurado MD  3/9/2018

## 2018-03-09 NOTE — DISCHARGE SUMMARY
More than 30 min spent on dc  Dc summary # [id number]  Hospital Discharge Diagnoses: chf    Lace+ Score: 72  59-90 High Risk  29-58 Medium Risk  0-28   Low Risk. TCM Follow-Up Recommendation:  LACE > 58:  High Risk of readmission after discharge from the h

## 2018-03-09 NOTE — CONSULTS
[address], Phs/Ihs  Patient Status:  Inpatient    1924 MRN [id number]   Location [facility] 3W/SW Attending Slick Romo, [address] [facility] Day # 8 PCP William Jurado     Date of Consult:  concentrated solution 5 mg, 5 mg, Oral, Q4H PRN  •  Potassium Chloride ER (K-DUR) CR tab 30 mEq, 30 mEq, Oral, Daily  •  CefTRIAXone Sodium (ROCEPHIN) 1 g in sodium chloride 0.9 % 100 mL MBP/ADD-vantage, 1 g, Intravenous, Q24H  •  Memantine HCl (NAMENDA) t or gallop. Lungs: Diminished bilaterally. Normal excursions and effort. Abdomen: Soft, non-tender, normal bowel sounds X 4 quadrants, no rebound or guarding  Extremities: Without clubbing, cyanosis.  Peripheral pulses are 2+. 2-3+ pitting BLE edema  Neur support  -Provided emotional support to pt/dtr who are coping adequately    Advance care planning  -Completed POLST form in chart  -Pt's dtr Samir Santa is her [de-identified] #[phone]    Palliative Performance Scale 40%  -CHF, severe TVR with no plan on intervent

## 2018-03-09 NOTE — CM/SW NOTE
3/9/18 CM Discharge planning / MDO transfer to SNF  Spoke with Emili Taylor adm at [facility], bed available today at 1:30 pm, RN report [phone], transport arranged via [address] [place] [address], pt is confused unable to be left unattended.     Spoke with dtr

## 2018-03-12 ENCOUNTER — TELEPHONE (OUTPATIENT)
Dept: PALLIATIVE CARE | Facility: HOSPITAL | Age: 83
End: 2018-03-12

## 2018-03-12 NOTE — DISCHARGE SUMMARY
[facility]    PATIENT'S NAME: KENDALLISIDROPJ   ATTENDING PHYSICIAN: Aaron Mensah MD   PATIENT ACCOUNT#:   [id number]    LOCATION:  [address] [place] [address] [place] #:   [id number]       YOB: 1924  ADMISSION DATE:       02/28 in the heart. ABDOMEN:  Soft. EXTREMITIES:  Significant 3+ pitting edema. NEUROLOGIC:  She is alert, but she has no ability to participate in intelligent conversation. LABORATORY STUDIES:  Please see chart. ASSESSMENT AND PLAN:    1.    Severe wid cannula p.r.n. oxygen. Dictated By Dat Jain.  MD Rodrick  d: 03/09/2018 18:06:29  t: 03/10/2018 16:05:48  [facility] [id number]  PLACIDO/

## 2018-03-12 NOTE — TELEPHONE ENCOUNTER
I received call this morning from pt's dtr with update on pt. She is concerned about pt's ongoing LE edema and tells me she is worried that her swelling will lead to oozing and skin breakdown.  She is concerned with the care at [facility] as they gerald

## 2018-03-15 ENCOUNTER — OFFICE VISIT (OUTPATIENT)
Dept: CARDIOLOGY CLINIC | Facility: HOSPITAL | Age: 83
End: 2018-03-15
Attending: INTERNAL MEDICINE
Payer: MEDICARE

## 2018-03-15 VITALS — OXYGEN SATURATION: 98 % | DIASTOLIC BLOOD PRESSURE: 65 MMHG | HEART RATE: 76 BPM | SYSTOLIC BLOOD PRESSURE: 101 MMHG

## 2018-03-15 PROCEDURE — 99214 OFFICE O/P EST MOD 30 MIN: CPT | Performed by: CLINICAL NURSE SPECIALIST

## 2018-03-15 PROCEDURE — 99211 OFF/OP EST MAY X REQ PHY/QHP: CPT | Performed by: CLINICAL NURSE SPECIALIST

## 2018-03-15 NOTE — PATIENT INSTRUCTIONS
Increase furosemide to 40 mg twice daily, pending today's lab results  Repeat BMP 3/22/18, fax results to clinic, [phone]  Track daily weights closely, call with gain of 3 pounds or greater or with concerning symptoms  Return to clinic as needed o

## 2018-03-15 NOTE — PROGRESS NOTES
[facility] Patient Status:  Outpatient    1924 MRN [id number]   Location [facility]   Dr. Dwayne Sarmiento is a 80year old female who presents to clinic for [facility] ventricle: The cavity size was normal. Systolic function was normal.  The estimated ejection fraction was 55-60%. Wall motion was normal; there  were no regional wall motion abnormalities. 2. Aortic valve: Mild regurgitation.   3. Mitral valve: Mildly calc or lightheadedness. Reviewed disease process, sodium restriction, fluid restriction, palliative care and medications, 50 minutes spent in education, receptive.   After lengthy conversation with daughter it is very apparent that their primary concern is com

## 2018-03-19 ENCOUNTER — TELEPHONE (OUTPATIENT)
Dept: CARDIOLOGY CLINIC | Facility: HOSPITAL | Age: 83
End: 2018-03-19

## 2018-03-19 NOTE — TELEPHONE ENCOUNTER
[facility], RN from [place] phoned with patient condition update. States patients weight has increased over the last several days from 133 lbs, then up to 136 and today 138lbs. States she is frequently coughing but O2 sats remain at 96%.  LE slightly

## 2018-03-21 ENCOUNTER — TELEPHONE (OUTPATIENT)
Dept: CARDIOLOGY CLINIC | Facility: HOSPITAL | Age: 83
End: 2018-03-21

## 2018-03-21 NOTE — TELEPHONE ENCOUNTER
Donny Camilo, RN phone with patient condition update. States patients weight has decreased by 2 lbs with increased diuretic. Also mentioned patient is currently not on any potassium supplements. Denies patient having any cramping. BMP scheduled for tomorrow.

## 2018-03-27 ENCOUNTER — TELEPHONE (OUTPATIENT)
Dept: CARDIOLOGY CLINIC | Facility: HOSPITAL | Age: 83
End: 2018-03-27

## 2018-03-27 NOTE — TELEPHONE ENCOUNTER
Octavia from facility called to say that patient is up 6 pounds. Last set BMP 3/22, creatinine 2.5, potassium 4.3. Will in crease furosemide from 40 mg twice daily to 80 mg in the a.m. and 40 mg in the p.m. for 3 days. Will repeat a BMP on Monday.   This e

## 2019-03-01 VITALS
DIASTOLIC BLOOD PRESSURE: 60 MMHG | BODY MASS INDEX: 23.98 KG/M2 | HEIGHT: 61 IN | OXYGEN SATURATION: 92 % | WEIGHT: 127 LBS | SYSTOLIC BLOOD PRESSURE: 100 MMHG | HEART RATE: 67 BPM

## 2021-02-13 DIAGNOSIS — Z23 NEED FOR VACCINATION: ICD-10-CM

## 2021-05-13 NOTE — PLAN OF CARE
GASTROINTESTINAL - ADULT    • Minimal or absence of nausea and vomiting Adequate for Discharge    • Maintains or returns to baseline bowel function Adequate for Discharge    • Maintains adequate nutritional intake (undernourished) Adequate for Discharge 151

## 2022-03-21 NOTE — TELEPHONE ENCOUNTER
Epic Med review was updated. ( this medication was reconciled) Per Dr Live, patient can be referred to weight loss clinic.

## (undated) DEVICE — SOL  .9 1000ML BTL

## (undated) DEVICE — STERILE LATEX POWDER-FREE SURGICAL GLOVESWITH NITRILE COATING: Brand: PROTEXIS

## (undated) DEVICE — SUTURE VICRYL 0 UR-6

## (undated) DEVICE — ADHESIVE MASTISOL 2/3CC VL

## (undated) DEVICE — LIGAMAX 5 MM ENDOSCOPIC MULTIPLE CLIP APPLIER: Brand: LIGAMAX

## (undated) DEVICE — OPEN-END URETERAL CATHETER SOF-FLEX: Brand: SOF-FLEX

## (undated) DEVICE — LAP CHOLE: Brand: MEDLINE INDUSTRIES, INC.

## (undated) DEVICE — ENDOSCOPY PACK UPPER: Brand: MEDLINE INDUSTRIES, INC.

## (undated) DEVICE — UNDYED BRAIDED (POLYGLACTIN 910), SYNTHETIC ABSORBABLE SUTURE: Brand: COATED VICRYL

## (undated) DEVICE — Device: Brand: DEFENDO AIR/WATER/SUCTION AND BIOPSY VALVE

## (undated) DEVICE — [HIGH FLOW INSUFFLATOR,  DO NOT USE IF PACKAGE IS DAMAGED,  KEEP DRY,  KEEP AWAY FROM SUNLIGHT,  PROTECT FROM HEAT AND RADIOACTIVE SOURCES.]: Brand: PNEUMOSURE

## (undated) DEVICE — FORCEP RADIAL JAW 4

## (undated) DEVICE — TROCAR: Brand: KII FIOS FIRST ENTRY

## (undated) DEVICE — GAUZE SPONGES,12 PLY: Brand: CURITY

## (undated) DEVICE — NEEDLE HPO 18GA 1.5IN ECLPS

## (undated) DEVICE — X-RAY DETECTABLE SPONGES,16 PLY: Brand: VISTEC

## (undated) DEVICE — TISSUE RETRIEVAL SYSTEM: Brand: INZII RETRIEVAL SYSTEM

## (undated) DEVICE — SOL  .9 3000ML

## (undated) DEVICE — TROCAR: Brand: KII® SLEEVE

## (undated) DEVICE — 12 ML SYRINGE LUER-LOCK TIP: Brand: MONOJECT

## (undated) NOTE — LETTER
[address], [place], [place]     AUTHORIZATION FOR SURGICAL OPERATION OR PROCEDURE    1.    I hereby authorize Dr. Lisette Dakins, MD, my Physician(s) and whomever may be designated as the doctor's Assistant, to perform to have an autologous transfusion of my own blood, or a directed donor transfusion, I will discuss this with my         Physician.   5.   I consent to the photographing of procedure(s) to be performed for the purposes of advancing medicine, science (Responsible person in case of minor or unconscious patient)   (Relationship to Patient)      _______________________________________________________________ ____________________________  (Witness signature)

## (undated) NOTE — LETTER
Hospital Discharge Documentation  Please phone to schedule a hospital follow up appointment.     From: [address] Hospitalist's Office  Phone: [phone]    Patient discharged time/date: 8/1/2017  8:30 PM  Patient discharge disposition:  Home or Self fracture that will be treated conservatively. She has been on a lidoderm patch and tylenol for pain. Discharge Physical Exam:   Physical Exam:    General: No acute distress. Respiratory: Clear to auscultation bilaterally. No wheezes. No rhonchi.   Car Potassium Chloride ER (K-DUR) 10 MEQ Oral Tab CR  Take 40 mEq by mouth daily.                 Discharge Diet: general diet     Discharge Activity: As tolerated       Discharge Medications      START taking these medications      Instructions Prescription de

## (undated) NOTE — MR AVS SNAPSHOT
[facility] SPECIALTY [facility] [place] [address] [id number] [phone]               Thank you for choosing us for your health care visit with Shana Montiel MD.  We are glad to serve you and happy to provide you with this summary of y Potassium Chloride ER 10 MEQ Tbcr   Take  by mouth daily.    Commonly known as:  NBA Stevenson                  Visit [facility] online at  [email or web address]

## (undated) NOTE — IP AVS SNAPSHOT
[address]  [address] [facility] [phone]                Discharge Summary   4/11/2017    [place]           Admission Information        Provider Department    4/11/2017 Jarett Shannon MD [facility] 3w/ What changed:  when to take this   Next dose due:  04/17/2017        Take 1 tablet (10 mg total) by mouth 2 (two) times daily.     Aminah Simon                           metoprolol Tartrate 25 MG Tabs   Last time this was given:  12.5 mg on 4/17/2017  8:4 24-48 hours and gradually improve over the initial post-operative week. Prescription pain medication (Norco) should be used initially according to the pain experienced and gradually substituted with Aleve 2 tablets every 12 hours as needed.   Prescription · Ask your doctor when you can expect to return to work. · Avoid constipation:  · Eat fruits, vegetables, and whole grains   · Drink 6–8 glasses of water a day, unless otherwise instructed.   · Use a laxative or a mild stool softener as instructed by your Recent Hematology Lab Results  (Last 3 results in the past 90 days)    WBC RBC Hemoglobin Hematocrit MCV MCH MCHC RDW Platelet MPV    (04/16/85)  9.3 (04/16/17)  4.10 (04/16/17)  11.5 (L) (04/16/17)  35.6 (04/16/17)  86.8 -- -- -- (04/16/17)  236 (04/16/17 - If you are a smoker or have smoked in the last 12 months, we encourage you to explore options for quitting.     - If you have concerns related to behavioral health issues or thoughts of harming yourself, contact [address] [place] [address] Most common side effects: Dizziness, loss of potassium (increased urination is expected)   What to report to your healthcare team: Dizziness, decreased urine amount           Narcotic Medications     HYDROcodone-acetaminophen 5-325 MG Oral Tab       Use:

## (undated) NOTE — LETTER
[facility]  Administration of Anesthesia  1.    I gina Fung _________________________________ acting on his/her behalf, (Patient) (Dependent/Representative) request to receive anesthesia for my pending procedure/operation/treatme pressure, difficulty urinating, slowing of the baby's heart rate and headache. Rare risks include infections, high spinal         block, spinal bleeding, seizure, cardiac arrest and death.   7.   AWARENESS: I understand that it is possible (but unlike ________________________________________________  (Date) (Time)                                                                                                  (Responsible person in case of minor/ unconscious pt) /Relationship    My signature below affir

## (undated) NOTE — LETTER
[address], [place]  Authorization for Invasive Procedures  1.  I hereby authorize Dr. Gerson Murillo , my physician and whomever may be designated as the doctor's assistant, to perform the following operation and/or procedure:  Esophagoga performed for the purposes of advancing medicine, science, and/or education, provided my identity is not revealed. If the procedure has been videotaped, the physician/surgeon will obtain the original videotape.  The hospital will not be responsible for stor My signature below affirms that prior to the time of the procedure, I have explained to the patient and/or her legal representative, the risks and benefits involved in the proposed treatment and any reasonable alternative to the proposed treatment.  I have

## (undated) NOTE — IP AVS SNAPSHOT
Patient Demographics     Address  [address] [place] [address] Phone  [phone] (Home) *Preferred*      Emergency Contact(s)     Name Relation Home Work [address] [place] [address] Dr anderson [facility] [address]        Allergies as of 3/9/2018 Take 1 tablet (20 mEq total) by mouth daily.            Where to Get Your Medications      You can get these medications from any pharmacy    Bring a paper prescription for each of these medications  · acetaminophen 325 MG Tabs  · albuterol sulfate (2.5 MG/ Simeon Stevens MD         furosemide 40 MG Tabs  Commonly known as:  LASIX  Next dose due:  3/10/18 in the morning      Take 1 tablet (40 mg total) by mouth daily.    Simeon Stevens MD         Memantine HCl 10 MG Tabs  Commonly known as: [id number] Memantine HCl (NAMENDA) tab 10 mg 03/09/18 0819 Given      [id number] Morphine Sulfate (Concentrate) concentrated solution 5 mg 03/09/18 1210 Given      [id number] Potassium Chloride ER (K-DUR) CR tab 30 mEq 03/09/18 0819 Given      [id number] nikki Glucose 90 70 - 99 mg/dL [facility]   Sodium 137 136 - 144 mmol/L — [facility]   Potassium 4.3 3.3 - 5.1 mmol/L — [facility]   Chloride 104 95 - 110 mmol/L — [facility]   CO2 24 22 - 32 mmol/L — [facility]   BUN 52 8 - 20 mg/dL [facility] Urine Culture No Growth at 18-24 hrs.          H&P - H&P Note      H&P signed by Gina Adair MD at 3/1/2018  7:01 AM  Version 1 of 1    Author:  Gina Adair MD Service:  (none) Author Type:  Physician    Filed:  3/1/2018  7:01 AM Date of Service reports that she quit smoking about 64 years ago. She has never used smokeless tobacco. She reports that she drinks alcohol. She reports that she does not use drugs.     Allergies:    Pcn [Penicillins]       Unknown    Home Medications:  Prior to Admission Lymphatics:  No lymphadenopathy neck, axilla, groin. Musculoskeletal: Normal range of motion. normal strength. Feet:  Normal pulses. Neurologic:  Alert, confused, no focal deficits, cranial nerves II-XII are grossly intact.   Cognition and Speech: Confu Consults signed by Uzair Abbasi MD at 3/2/2018  1:38 PM     Author:  Uzair Abbasi MD Service:  (none) Author Type:  Physician    Filed:  3/2/2018  1:38 PM Date of Service:  3/2/2018  1:24 PM Status:  Signed    :  Uzair Abbasi MD (Physi The patient does not use an assistive device. .      The patient does live in a home with stairs.                 Current Medications:    Current Facility-Administered Medications:  Memantine HCl (NAMENDA) tab 10 mg 10 mg Oral BID   metoprolol Tartrate (LO Extremities: 3-4+ bilateral LE edema   Skin: No rashes or lesions  Lymph nodes: Cervical, supraclavicular normal  Neurologic: moving all extremities   Psychiatric: calm    Results:     Laboratory Data:  Recent Labs   Lab  02/28/18   4889  03/01/18   [id number] Discharge Summaries signed by Prakash Caba MD at 3/9/2018 12:23 PM  Version 1 of 1    Author:  Prakash Caba MD Service:  Hospitalist Author Type:  Physician    Filed:  3/9/2018 12:23 PM Date of Service:  3/9/2018 12:22 PM Status:  All Day Joseph Gamez MD Sonographer: Nena Worley  CC: Marry Lopez MD  ------------------------------------------------------------------------------- Study Conclusions 1. Left ventricle:  The cavity size was normal. Systolic function was normal. ---- Findings Left ventricle: The cavity size was normal. There was no hypertrophy. Systolic function was normal. The estimated ejection fraction was 55-60%. Wall motion was normal; there were no regional wall motion abnormalities.  The study was not techn posterior wall thickness, ED                 8.89 mm     ------- IVS/LVPW ratio, ED                              1.06        <1.3 Ventricular septum Septal thickness, ED                            9.39 mm     ------- Aorta Root diameter, ED Physical Therapy Notes (last 72 hours) (Notes from 3/6/2018  1:04 PM through 3/9/2018  1:04 PM)      Physical Therapy Note signed by Tony Zheng PT at 3/8/2018  2:58 PM  Version 2 of 2    Author:   Kayla Corcoran, April, PT Service:  Rehab Author Type:  Ph therapy in a supervised setting[AO.2]     PLAN[AO.1]  PT Treatment Plan: Bed mobility; Endurance; Energy conservation;Patient education; Family education;Gait training;Transfer training;Balance training[AO.2]    SUBJECTIVE  \"I want some ice cold water. \" \"i FUNCTIONAL ABILITY STATUS  Gait Assessment[AO.1]   Gait Assistance: Minimum assistance  Distance (ft): 6' x 1  Assistive Device: Rolling walker  Pattern: Shuffle (flexed posture, decreased safety awareness )  Stoop/Curb Assistance: Not tested  Comment : Pa Related Notes:  Addendum by Luis Angel Rousseau PT (Physical Therapist) filed at 3/8/2018  2:58 PM       PHYSICAL THERAPY TREATMENT NOTE - INPATIENT     Room Number: 305/305-A[AO.1]       Presenting Problem:  (Acute on CHF,Weakness,H/O Dementia,falls,early constant verbal[AO.1] and tactile[AO.3] cues for sequencing and safety     WEIGHT BEARING RESTRICTION[AO.1]  Weight Bearing Restriction: None[AO.2]     PAIN ASSESSMENT[AO.1]   Ratin  Location: denies pain and seemed comfortable  Management Techniques: Patient End of Session: Up in chair;Needs met;Call light within reach;RN aware of session/findings; Alarm set[AO.2]    CURRENT GOALS[AO.1]      Goals to be met by: 3/10/2018  Patient Goal Patient's self-stated goal is: to go home   Goal #1 Patient is able t Goals of care, counseling/discussion    Advance care planning[MJ. 2]      ASSESSMENT[MJ.1]   RN Vladimir Caba approves participation in therapy session. Patient presents sitting up in chair and is agreeable to therapy.  She requires min-CGA for sit to/from stand an Little[MJ.2]   How much help from another person does the patient currently need. .. [MJ.1]   -   Moving to and from a bed to a chair (including a wheelchair)?: A Little   -   Need to walk in hospital room?: A Little   -   Climbing 3-5 steps with a railing?: instructions provided to patient in preparation for discharge.    Goal #5   Current Status  In progress   Goal #6     Goal #6  Current Status      [MJ.3]         Attribution [place]    MJ.1 - Estelita Nathan, PT on 3/6/2018 10:45 PM  MJ.2 - Estelita Nathan, PT on 3

## (undated) NOTE — MR AVS SNAPSHOT
[facility] [place] [address] [address]  [phone]               Thank you for choosing us for your health care visit with MEGHAN Hankins.   We are glad to serve you and happy to provide you with this summary [place] [address], [phone], [phone]  [address] [place] [address], [address] [place] [address]     Phone:  [phone]    - [id number] 40 MG Tabs            MyChart                  Visit [facility] online at  [facility]

## (undated) NOTE — LETTER
Hospital Discharge Documentation  Pt was dc to [facility]. No discharge summary available at this time, below is the most recent progress note  for your review .         From: [address] Hospitalist's Office  Phone: [phone]    Patient [employer] TCM Follow-Up Recommendation:  LACE > 58: High Risk of readmission after discharge from the hospital.tcm fu recommended[LS.1]        Electronically signed by Anamaria Phillips MD on 3/9/2018 12:23 PM   Attribution Key     LS. 1 - Meenu Mensah 2.Acute renal failure on ckd-4 - baseline creatinine normal in October. Likely cardiorenal and lasix   - renal consulted - apprec input - as above   - renal u/s ordered and neg for hydo     3. Lower ext edema  Multifactorial  ?realted to HFwpEF, possibly re